# Patient Record
Sex: FEMALE | Race: WHITE | NOT HISPANIC OR LATINO
[De-identification: names, ages, dates, MRNs, and addresses within clinical notes are randomized per-mention and may not be internally consistent; named-entity substitution may affect disease eponyms.]

---

## 2017-06-11 ENCOUNTER — FORM ENCOUNTER (OUTPATIENT)
Age: 62
End: 2017-06-11

## 2017-12-11 ENCOUNTER — FORM ENCOUNTER (OUTPATIENT)
Age: 62
End: 2017-12-11

## 2018-06-18 ENCOUNTER — FORM ENCOUNTER (OUTPATIENT)
Age: 63
End: 2018-06-18

## 2018-11-28 ENCOUNTER — FORM ENCOUNTER (OUTPATIENT)
Age: 63
End: 2018-11-28

## 2018-12-11 ENCOUNTER — FORM ENCOUNTER (OUTPATIENT)
Age: 63
End: 2018-12-11

## 2019-01-31 PROBLEM — Z00.00 ENCOUNTER FOR PREVENTIVE HEALTH EXAMINATION: Status: ACTIVE | Noted: 2019-01-31

## 2019-02-04 ENCOUNTER — APPOINTMENT (OUTPATIENT)
Dept: OPHTHALMOLOGY | Facility: CLINIC | Age: 64
End: 2019-02-04

## 2019-02-28 ENCOUNTER — APPOINTMENT (OUTPATIENT)
Dept: OPHTHALMOLOGY | Facility: CLINIC | Age: 64
End: 2019-02-28

## 2019-05-20 ENCOUNTER — APPOINTMENT (OUTPATIENT)
Dept: OPHTHALMOLOGY | Facility: CLINIC | Age: 64
End: 2019-05-20
Payer: COMMERCIAL

## 2019-05-20 ENCOUNTER — NON-APPOINTMENT (OUTPATIENT)
Age: 64
End: 2019-05-20

## 2019-05-20 PROCEDURE — 92004 COMPRE OPH EXAM NEW PT 1/>: CPT

## 2019-06-12 ENCOUNTER — FORM ENCOUNTER (OUTPATIENT)
Age: 64
End: 2019-06-12

## 2019-08-20 ENCOUNTER — FORM ENCOUNTER (OUTPATIENT)
Age: 64
End: 2019-08-20

## 2019-12-22 ENCOUNTER — FORM ENCOUNTER (OUTPATIENT)
Age: 64
End: 2019-12-22

## 2020-03-15 ENCOUNTER — FORM ENCOUNTER (OUTPATIENT)
Age: 65
End: 2020-03-15

## 2020-04-29 ENCOUNTER — TRANSCRIPTION ENCOUNTER (OUTPATIENT)
Age: 65
End: 2020-04-29

## 2020-05-18 ENCOUNTER — APPOINTMENT (OUTPATIENT)
Dept: OPHTHALMOLOGY | Facility: CLINIC | Age: 65
End: 2020-05-18

## 2020-08-16 ENCOUNTER — FORM ENCOUNTER (OUTPATIENT)
Age: 65
End: 2020-08-16

## 2021-01-22 ENCOUNTER — NON-APPOINTMENT (OUTPATIENT)
Age: 66
End: 2021-01-22

## 2021-03-15 ENCOUNTER — APPOINTMENT (OUTPATIENT)
Dept: ORTHOPEDIC SURGERY | Facility: CLINIC | Age: 66
End: 2021-03-15
Payer: MEDICARE

## 2021-03-15 VITALS
DIASTOLIC BLOOD PRESSURE: 103 MMHG | SYSTOLIC BLOOD PRESSURE: 163 MMHG | HEIGHT: 71 IN | WEIGHT: 170 LBS | OXYGEN SATURATION: 98 % | HEART RATE: 89 BPM | BODY MASS INDEX: 23.8 KG/M2

## 2021-03-15 PROCEDURE — 99203 OFFICE O/P NEW LOW 30 MIN: CPT

## 2021-03-15 PROCEDURE — 99072 ADDL SUPL MATRL&STAF TM PHE: CPT

## 2021-03-15 PROCEDURE — 73030 X-RAY EXAM OF SHOULDER: CPT | Mod: RT

## 2021-03-15 RX ORDER — ESTRADIOL 10 UG/1
10 TABLET VAGINAL
Refills: 0 | Status: ACTIVE | COMMUNITY

## 2021-03-15 NOTE — PHYSICAL EXAM
[UE] : Sensory: Intact in bilateral upper extremities [Rad] : radial 2+ and symmetric bilaterally [Normal RUE] : Right Upper Extremity: No scars, rashes, lesions, ulcers, skin intact [Normal LUE] : Left Upper Extremity: No scars, rashes, lesions, ulcers, skin intact [Normal Touch] : sensation intact for touch [Normal] : Oriented to person, place, and time, insight and judgement were intact and the affect was normal [de-identified] :  shoulder:\par Cervical spine is without tenderness and ROM is within normal limits and without pain.\par Normal appearance. \par AROM: 180 FE, IR to T8 vs T7, 180 abd.\par PROM: 180 FE, 70 ER at the side, 90 ER and 65 IR in the 90 degree abducted position.\par Motor:  5/5  supraspinatus,  5/5 ER, 5/5 IR, 5/5 biceps, 5/5 deltoid.  Normal lift off test\par + Neer, + Wheeler. -  X-arm.   - Richmond's, - Speeds.\par Tender over the biceps tendon and the anterior shoulder.  \par No scapular winging.\par Sensation is intact distally in the UE.\par Skin is intact in the UE. \par Intact Motor distally.\par  [de-identified] : No respiratory distress or cough [de-identified] : \par X-rays of the right shoulder AP, Y. lateral and axillary views today are unremarkable. No bone lesions, calcifications or osteoarthritis.There may be some osteopenia which she was diagnosed with.

## 2021-03-15 NOTE — CONSULT LETTER
[Dear  ___] : Dear  [unfilled], [Consult Letter:] : I had the pleasure of evaluating your patient, [unfilled]. [Please see my note below.] : Please see my note below. [Consult Closing:] : Thank you very much for allowing me to participate in the care of this patient.  If you have any questions, please do not hesitate to contact me. [Sincerely,] : Sincerely, [FreeTextEntry2] : Alan Dechiario, MD [FreeTextEntry3] : Nazia Mckenzie MD\par Orthopedic Surgery\par Sports Medicine\par

## 2021-03-15 NOTE — HISTORY OF PRESENT ILLNESS
[de-identified] : Ms. Nazario is a 64 yo woman Right-hand dominant who comes in with RIGHT shoulder pain over the past year. In 2014 she had a RIGHT mastectomy. Then in August 2019 she had removal of a melanoma from her RIGHT lateral shoulder. There was a very large incision but the melanoma did not go deep. It took a while for it to heal and she did not move her arm a lot after that. She started to move it more but had some stiffness and pain and has had ongoing symptoms in the shoulder over the past year. She plays golf which can be aggravating. Moving her arm excessively causes pain wears it's better with rest. Pain can be a 4/10  intermittently

## 2021-03-15 NOTE — ASSESSMENT
[FreeTextEntry1] : 65-year-old right-hand-dominant woman with RIGHT shoulder pain intermittently over the past year. She has some mild loss of internal rotation in her RIGHT shoulder compared to the LEFT and pain consistent with impingement syndrome or some rotator cuff tendinopathy and bursitis.Overall she has good strength however and good motion. I think she would benefit from physical therapy. Perhaps her shoulder got stiff from not using it for about 6 months following the melanoma excision on the RIGHT arm.\par she was given home exercises to do for the shoulder as well. Heat and ice and ibuprofen as needed. If it still isn't better after doing a couple months of physical therapy then she should come in and we will work it up further likely starting with an MRI

## 2021-04-26 ENCOUNTER — APPOINTMENT (OUTPATIENT)
Dept: ORTHOPEDIC SURGERY | Facility: CLINIC | Age: 66
End: 2021-04-26
Payer: MEDICARE

## 2021-04-26 PROCEDURE — 99213 OFFICE O/P EST LOW 20 MIN: CPT

## 2021-04-26 PROCEDURE — 99072 ADDL SUPL MATRL&STAF TM PHE: CPT

## 2021-04-26 NOTE — ASSESSMENT
[FreeTextEntry1] : 65-year-old right-hand-dominant woman with RIGHT shoulder pain intermittently over the past year.  She had been diagnosed with shoulder impingement/rotator cuff tendinopathy.  Shoulder has improved with 4 weeks of therapy and modified activities and low-dose ibuprofen.\par She had done therapy in [place].  She is back in [place] now and will do a little more physical therapy here and do exercises on her own.  Since she is doing so much better she should be careful not to push too hard and aggravate her shoulder.  Ibuprofen as needed and heat and ice as needed.\par Follow-up  after doing another month of therapy if she is not fully better.  She has ongoing pain we may consider an MRI or getting an MRI.

## 2021-04-26 NOTE — PHYSICAL EXAM
[UE] : Sensory: Intact in bilateral upper extremities [Rad] : radial 2+ and symmetric bilaterally [Normal RUE] : Right Upper Extremity: No scars, rashes, lesions, ulcers, skin intact [Normal LUE] : Left Upper Extremity: No scars, rashes, lesions, ulcers, skin intact [Normal Touch] : sensation intact for touch [Normal] : Oriented to person, place, and time, insight and judgement were intact and the affect was normal [de-identified] : Right  shoulder:\par Cervical spine is without tenderness and ROM is within normal limits and without pain.\par Normal appearance shoulders\par AROM: 180 FE a few degrees less right than left, IR to T8 vs T7, 180 abd.\par PROM: 180 FE, 70 ER at the side, 90 ER and 65 IR in the 90 degree abducted position.\par Motor:  5/5  supraspinatus,  5/5 ER, 5/5 IR, 5/5 biceps, 5/5 deltoid.  Normal lift off test\par - Neer, + Wheeler. -  X-arm.   - Dearborn's, - Speeds.\par nontender over the biceps tendon and the anterior shoulder.  \par No scapular winging.\par Sensation is intact distally in the UE.\par Skin is intact in the UE. \par Intact Motor distally.\par  [de-identified] : No respiratory distress or cough [de-identified] : \par X-rays of the right shoulder AP, Y. lateral and axillary views 3/15/21 were unremarkable. No bone lesions, calcifications or osteoarthritis.

## 2021-04-26 NOTE — HISTORY OF PRESENT ILLNESS
[de-identified] : Ms. Nazario is a 64 yo woman Right-hand dominant who comes in for f/u for RIGHT shoulder pain over the past year.  Her shoulder has improved significantly. She started PT and did 4 wks\par  Pain can be a 1-2/10  intermittently. It feels a bit stiff in the morning\par  She is doing home exercises. She played golf in [place].\par She takes Advil 1 BID.

## 2021-06-08 PROBLEM — N64.9 DISORDER OF BREAST, UNSPECIFIED: Status: ACTIVE | Noted: 2021-06-08

## 2021-06-10 ENCOUNTER — APPOINTMENT (OUTPATIENT)
Dept: BREAST CENTER | Facility: CLINIC | Age: 66
End: 2021-06-10
Payer: MEDICARE

## 2021-06-10 VITALS
HEART RATE: 80 BPM | BODY MASS INDEX: 23.8 KG/M2 | DIASTOLIC BLOOD PRESSURE: 98 MMHG | SYSTOLIC BLOOD PRESSURE: 142 MMHG | HEIGHT: 71 IN | WEIGHT: 170 LBS

## 2021-06-10 DIAGNOSIS — N64.9 DISORDER OF BREAST, UNSPECIFIED: ICD-10-CM

## 2021-06-10 PROCEDURE — 99214 OFFICE O/P EST MOD 30 MIN: CPT

## 2021-06-10 PROCEDURE — 99072 ADDL SUPL MATRL&STAF TM PHE: CPT

## 2021-06-23 ENCOUNTER — NON-APPOINTMENT (OUTPATIENT)
Age: 66
End: 2021-06-23

## 2021-06-23 ENCOUNTER — APPOINTMENT (OUTPATIENT)
Dept: OPHTHALMOLOGY | Facility: CLINIC | Age: 66
End: 2021-06-23
Payer: MEDICARE

## 2021-06-23 PROCEDURE — 99072 ADDL SUPL MATRL&STAF TM PHE: CPT

## 2021-06-23 PROCEDURE — 92014 COMPRE OPH EXAM EST PT 1/>: CPT

## 2021-06-26 ENCOUNTER — TRANSCRIPTION ENCOUNTER (OUTPATIENT)
Age: 66
End: 2021-06-26

## 2021-10-13 ENCOUNTER — APPOINTMENT (OUTPATIENT)
Dept: ORTHOPEDIC SURGERY | Facility: CLINIC | Age: 66
End: 2021-10-13
Payer: MEDICARE

## 2021-10-13 PROCEDURE — 99214 OFFICE O/P EST MOD 30 MIN: CPT

## 2021-10-13 NOTE — ASSESSMENT
[FreeTextEntry1] : 65-year-old woman whose right shoulder pain/impingement has resolved with the therapy and is doing well.  She comes in now for her right knee pain.  I suspect that she has some chondromalacia or mild arthritis causing the pain.\par I recommended trying physical therapy and strengthening.  She was given home exercises and referred to therapy.  Heat and ice as needed.  She can take some ibuprofen or Aleve as needed as well.\par Her knee should get progressively better.\par If not she should come in and we will get x-rays next visit and work-up further.
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
## 2021-10-13 NOTE — PHYSICAL EXAM
[UE/LE] : Sensory: Intact in bilateral upper & lower extremities [Rad] : radial 2+ and symmetric bilaterally [Normal RUE] : Right Upper Extremity: No scars, rashes, lesions, ulcers, skin intact [Normal LUE] : Left Upper Extremity: No scars, rashes, lesions, ulcers, skin intact [Normal Touch] : sensation intact for touch [Normal] : Oriented to person, place, and time, insight and judgement were intact and the affect was normal [de-identified] : Right  shoulder:\par Normal appearance shoulders\par AROM: 180 FE a few degrees less right than left, IR to T8 vs T7, 180 abd.\par PROM: 180 FE, 70 ER at the side, 90 ER and 65 IR in the 90 degree abducted position.\par Motor:  5/5  supraspinatus,  5/5 ER, 5/5 IR, 5/5 biceps, 5/5 deltoid.  Normal lift off test\par - Elliot, - Wheeler. -  X-arm.   - Clifford's, - Speeds.\par nontender over the biceps tendon and the anterior shoulder.  \par No scapular winging.\par Sensation is intact distally in the UE.\par Skin is intact in the UE. \par Intact Motor distally.\par \par Knees\par No edema, ecchymoses, erythema.\par Skin is intact.\par Knee range of motion 0 to 135 degrees.  There is mild patellofemoral crepitus.\par Nontender patella facets, patella tendon or medial or lateral joint lines of either knee of any significance.\par Negative Deepak.\par Ia Lachman.  Negative anterior and posterior drawer and varus and valgus laxity.\par Normal neurovascular exam distally.\par She can squat fully without pain or difficulty [de-identified] : No respiratory distress or cough

## 2021-10-25 ENCOUNTER — TRANSCRIPTION ENCOUNTER (OUTPATIENT)
Age: 66
End: 2021-10-25

## 2022-06-22 ENCOUNTER — NON-APPOINTMENT (OUTPATIENT)
Age: 67
End: 2022-06-22

## 2022-06-22 ENCOUNTER — APPOINTMENT (OUTPATIENT)
Dept: OPHTHALMOLOGY | Facility: CLINIC | Age: 67
End: 2022-06-22
Payer: MEDICARE

## 2022-06-22 PROCEDURE — 92014 COMPRE OPH EXAM EST PT 1/>: CPT

## 2022-07-20 ENCOUNTER — NON-APPOINTMENT (OUTPATIENT)
Age: 67
End: 2022-07-20

## 2022-07-20 DIAGNOSIS — R92.8 OTHER ABNORMAL AND INCONCLUSIVE FINDINGS ON DIAGNOSTIC IMAGING OF BREAST: ICD-10-CM

## 2022-08-04 ENCOUNTER — APPOINTMENT (OUTPATIENT)
Dept: BREAST CENTER | Facility: CLINIC | Age: 67
End: 2022-08-04

## 2022-08-04 ENCOUNTER — NON-APPOINTMENT (OUTPATIENT)
Age: 67
End: 2022-08-04

## 2022-08-04 VITALS
SYSTOLIC BLOOD PRESSURE: 137 MMHG | BODY MASS INDEX: 22.96 KG/M2 | HEART RATE: 80 BPM | WEIGHT: 164 LBS | HEIGHT: 71 IN | DIASTOLIC BLOOD PRESSURE: 82 MMHG

## 2022-08-04 DIAGNOSIS — Z85.3 PERSONAL HISTORY OF MALIGNANT NEOPLASM OF BREAST: ICD-10-CM

## 2022-08-04 DIAGNOSIS — Z90.11 ACQUIRED ABSENCE OF RIGHT BREAST AND NIPPLE: ICD-10-CM

## 2022-08-04 DIAGNOSIS — Z80.0 FAMILY HISTORY OF MALIGNANT NEOPLASM OF DIGESTIVE ORGANS: ICD-10-CM

## 2022-08-04 DIAGNOSIS — I10 ESSENTIAL (PRIMARY) HYPERTENSION: ICD-10-CM

## 2022-08-04 DIAGNOSIS — M75.41 IMPINGEMENT SYNDROME OF RIGHT SHOULDER: ICD-10-CM

## 2022-08-04 DIAGNOSIS — Z60.2 PROBLEMS RELATED TO LIVING ALONE: ICD-10-CM

## 2022-08-04 DIAGNOSIS — Z85.820 PERSONAL HISTORY OF MALIGNANT MELANOMA OF SKIN: ICD-10-CM

## 2022-08-04 DIAGNOSIS — Z72.89 OTHER PROBLEMS RELATED TO LIFESTYLE: ICD-10-CM

## 2022-08-04 DIAGNOSIS — Z78.9 OTHER SPECIFIED HEALTH STATUS: ICD-10-CM

## 2022-08-04 DIAGNOSIS — Z80.3 FAMILY HISTORY OF MALIGNANT NEOPLASM OF BREAST: ICD-10-CM

## 2022-08-04 PROCEDURE — 99215 OFFICE O/P EST HI 40 MIN: CPT

## 2022-08-04 RX ORDER — HYDROCHLOROTHIAZIDE 12.5 MG/1
12.5 TABLET ORAL
Qty: 90 | Refills: 0 | Status: ACTIVE | COMMUNITY
Start: 2022-03-03

## 2022-08-04 RX ORDER — HYDROCHLOROTHIAZIDE 12.5 MG/1
TABLET ORAL
Refills: 0 | Status: ACTIVE | COMMUNITY

## 2022-08-04 SDOH — SOCIAL STABILITY - SOCIAL INSECURITY: PROBLEMS RELATED TO LIVING ALONE: Z60.2

## 2022-08-08 ENCOUNTER — NON-APPOINTMENT (OUTPATIENT)
Age: 67
End: 2022-08-08

## 2022-08-09 ENCOUNTER — NON-APPOINTMENT (OUTPATIENT)
Age: 67
End: 2022-08-09

## 2022-08-11 ENCOUNTER — APPOINTMENT (OUTPATIENT)
Dept: ORTHOPEDIC SURGERY | Facility: CLINIC | Age: 67
End: 2022-08-11

## 2022-08-16 ENCOUNTER — OUTPATIENT (OUTPATIENT)
Dept: OUTPATIENT SERVICES | Facility: HOSPITAL | Age: 67
LOS: 1 days | End: 2022-08-16
Payer: MEDICARE

## 2022-08-16 ENCOUNTER — RESULT REVIEW (OUTPATIENT)
Age: 67
End: 2022-08-16

## 2022-08-16 DIAGNOSIS — C50.911 MALIGNANT NEOPLASM OF UNSPECIFIED SITE OF RIGHT FEMALE BREAST: ICD-10-CM

## 2022-08-16 LAB — SURGICAL PATHOLOGY STUDY: SIGNIFICANT CHANGE UP

## 2022-08-16 PROCEDURE — 88321 CONSLTJ&REPRT SLD PREP ELSWR: CPT

## 2022-08-23 ENCOUNTER — TRANSCRIPTION ENCOUNTER (OUTPATIENT)
Age: 67
End: 2022-08-23

## 2022-08-23 ENCOUNTER — APPOINTMENT (OUTPATIENT)
Dept: HEMATOLOGY ONCOLOGY | Facility: CLINIC | Age: 67
End: 2022-08-23

## 2022-08-23 ENCOUNTER — APPOINTMENT (OUTPATIENT)
Dept: ORTHOPEDIC SURGERY | Facility: AMBULATORY SURGERY CENTER | Age: 67
End: 2022-08-23

## 2022-08-23 DIAGNOSIS — M25.561 PAIN IN RIGHT KNEE: ICD-10-CM

## 2022-08-23 DIAGNOSIS — S80.02XA CONTUSION OF LEFT KNEE, INITIAL ENCOUNTER: ICD-10-CM

## 2022-08-23 DIAGNOSIS — S80.01XA CONTUSION OF RIGHT KNEE, INITIAL ENCOUNTER: ICD-10-CM

## 2022-08-23 PROCEDURE — 99205 OFFICE O/P NEW HI 60 MIN: CPT

## 2022-08-23 PROCEDURE — 73564 X-RAY EXAM KNEE 4 OR MORE: CPT | Mod: 50

## 2022-08-23 PROCEDURE — 99214 OFFICE O/P EST MOD 30 MIN: CPT

## 2022-08-23 NOTE — PHYSICAL EXAM
[Normal Touch] : sensation intact for touch [Normal] : Oriented to person, place, and time, insight and judgement were intact and the affect was normal [UE/LE] : Sensory: Intact in bilateral upper & lower extremities [Rad] : radial 2+ and symmetric bilaterally [Normal RUE] : Right Upper Extremity: No scars, rashes, lesions, ulcers, skin intact [Normal LUE] : Left Upper Extremity: No scars, rashes, lesions, ulcers, skin intact [de-identified] : Right shoulder:\par Normal appearance shoulders\par AROM: 180 FE a few degrees less right than left, IR to T8 vs T7, 180 abd.\par PROM: 180 FE, 70 ER at the side, 90 ER and 35 IR in the 90 degree abducted position.\par Motor:  5/5  supraspinatus,  5/5 ER, 5/5 IR, 5/5 biceps, 5/5 deltoid.  Normal lift off test\par Mild pain with Neer and Wheeler. -  X-arm.   - Norman's, - Speeds.\par nontender over the biceps tendon and the anterior shoulder.  \par No scapular winging.\par Sensation is intact distally in the UE.\par Skin is intact in the UE. \par Intact Motor distally.\par \par Bilateral knees\par Normal gait.\par Pain and cannot squat fully.\par No edema, erythema.  Resolving ecchymoses anteromedial knees\par Skin is intact\par ROM is 0-130 degrees on the right and 135 degrees left knee. There is mild patellofemoral crepitius.\par Tender anterior lateral right knee.\par Intact extensor mechanism.\par Negative Deepak\par 1A Lachman, negative anterior/posterior drawer and normal varus/valgus laxity\par Normal neurovascular exam distally [de-identified] : No respiratory distress or cough [de-identified] : \par X-rays of bilateral knees weightbearing 4 views today showed  Minimal joint space narrowing compartment on right greater than left knee.  No fractures or acute changes or any significant arthritis. KL1

## 2022-08-23 NOTE — ASSESSMENT
[FreeTextEntry1] : 65 y/o female with RIGHT knee pain following a fall on both knees.  She had contusions of her knees and fall on her outstretched right arm.  Exam is consistent with some bruising but no fractures are suspected or seen.  She likely has some chondral wear in the knees without significant arthritis.  \par Her shoulder she may have strained her rotator cuff slightly but overall it also seems to be relatively good.\par \par I recommended icing intermittently.  Ibuprofen as needed. \par She was given home exercises with rotator cuff and periscapular strengthening for the shoulder and for her knees she continues to do strengthening with leg lifts but avoid anything aggravating.\par Follow-up if she is not better in the next month or as needed at any time.

## 2022-08-23 NOTE — HISTORY OF PRESENT ILLNESS
[de-identified] : Ms. Nazario is a 67 y/o woman who comes in for injury involving her right knee more than the left from a fall that occurred about 2 weeks ago when she was at her home walking up some wooden steps in flip-flops which got caught and she fell forward on her knees and outstretched right arm.\par She had some right shoulder and knee pain in the past and was seen in October 2021 for the right shoulder and right knee.\par \par From the injury she can move but has pain with deep squatting or bending.  It does seem to be getting better.  There was bruising on both knees.  She can move her shoulder but it is a little more painful now\par She has been taking 1 Advil once or twice a day.\par

## 2022-08-30 ENCOUNTER — APPOINTMENT (OUTPATIENT)
Dept: HEMATOLOGY ONCOLOGY | Facility: CLINIC | Age: 67
End: 2022-08-30

## 2022-08-30 PROCEDURE — 99215 OFFICE O/P EST HI 40 MIN: CPT

## 2022-08-30 RX ORDER — LORAZEPAM 0.5 MG/1
0.5 TABLET ORAL TWICE DAILY
Qty: 30 | Refills: 0 | Status: ACTIVE | COMMUNITY
Start: 2022-08-30 | End: 1900-01-01

## 2022-08-31 ENCOUNTER — APPOINTMENT (OUTPATIENT)
Dept: BREAST CENTER | Facility: CLINIC | Age: 67
End: 2022-08-31

## 2022-09-02 ENCOUNTER — NON-APPOINTMENT (OUTPATIENT)
Age: 67
End: 2022-09-02

## 2022-09-19 ENCOUNTER — APPOINTMENT (OUTPATIENT)
Dept: HEMATOLOGY ONCOLOGY | Facility: CLINIC | Age: 67
End: 2022-09-19

## 2022-09-19 PROCEDURE — 99215 OFFICE O/P EST HI 40 MIN: CPT

## 2022-09-23 ENCOUNTER — NON-APPOINTMENT (OUTPATIENT)
Age: 67
End: 2022-09-23

## 2022-10-25 ENCOUNTER — APPOINTMENT (OUTPATIENT)
Dept: PSYCHIATRY | Facility: CLINIC | Age: 67
End: 2022-10-25

## 2022-10-25 PROCEDURE — 90792 PSYCH DIAG EVAL W/MED SRVCS: CPT | Mod: 95

## 2022-10-27 ENCOUNTER — NON-APPOINTMENT (OUTPATIENT)
Age: 67
End: 2022-10-27

## 2022-11-04 ENCOUNTER — APPOINTMENT (OUTPATIENT)
Dept: PSYCHIATRY | Facility: CLINIC | Age: 67
End: 2022-11-04

## 2022-11-04 ENCOUNTER — NON-APPOINTMENT (OUTPATIENT)
Age: 67
End: 2022-11-04

## 2022-11-04 NOTE — PHYSICAL EXAM
[Cooperative] : cooperative [Depressed] : depressed [Constricted] : constricted [Clear] : clear [Linear/Goal Directed] : linear/goal directed [None] : none [None Reported] : none reported [Average] : average [WNL] : within normal limits

## 2022-11-09 NOTE — PLAN
[Supportive Therapy] : Supportive Therapy [Recommended Frequency of Visits: ____] : Recommended frequency of visits: [unfilled] [Return in ____ week(s)] : Return in [unfilled] week(s) [FreeTextEntry2] : Problem/Need I: \par Domain for Problem/Need I: Mental Health. \par Problem: Experiences challenging emotions in response to daily life and her medical illness and treatment. \par Goal (In patient's words): Express challenging emotions. \par Status of Goal: Initial \par Objective A: Process challenging emotions in a supportive environment \par Status of Objective: Initial \par Individual Therapy: every 2 weeks. \par \par Problem/Need II: \par Domain for Problem/Need II: Substance Abuse. \par Problem: Alcohol Use. \par Goal (In patient's words): Manage alcohol use. \par Status of Goal: Initial\par Objective A: Learn and utilize effective coping skills so as to prevent increases in alcohol consumption.\par Status of Objective: Initial \par Individual Therapy: every 2 weeks.  [de-identified] : The writer met with the patient for individual psychotherapy via video. The writer and patient completed intake screeners, including the SOS-10 and completing the HAM-D. The writer and patient also completed the patient's treatment plan and discussed treatment goals. The writer and patient also discussed the patient's relationships and recent activity. Supportive therapy was provided throughout. No S/H/I/I/P/A/V/H reported. The patient was meaningfully engaged and receptive to these interventions. The patient ended the session in good behavioral and emotional control.  [FreeTextEntry1] : 1. The patient will engage in individual psychotherapy every other week. \par 2. The patient will continue to be evaluated by psychiatry as appropriate.

## 2022-11-09 NOTE — ADDENDUM
[FreeTextEntry1] : Case discussed in clinical supervision [[X]] individual [[X]] group (Check one) ASSESSMENT METHOD (Check all that apply):  [[X]] Case presentation  [[X]] Review of Record/Data  [[ ]] Direct Observation  [[ ]] Audio Recording  [[ ]] Video Recording  FOCUS OF SUPERVISION (Check all that apply):  [[X]] Diagnosis & Assessment  [[X]] Intervention  [[X]] Professional Conduct  [[ ]] Culture and Diversity  [[ ]] Scholarly Inquiry    [[ ]] Consultation  [[ ]] Supervision  [[ ]] Administration/Documentation

## 2022-11-09 NOTE — DISCUSSION/SUMMARY
[Initial Plan] : Initial Plan [Able to manage surrounding demands and opportunities] : able to manage surrounding demands and opportunities [Able to exercise self-direction] : able to exercise self-direction [Able to set and pursue goals] : able to set and pursue goals [Articulate] : articulate [Cognitively intact] : cognitively intact [Intelligent] : intelligent [Motivated to participate in treatment] : motivated to participate in treatment [Health literacy] : health literacy [Motivated to maintain or improve physical health] : motivated to maintain or improve physical health [Financially stable] : financially stable [Has vocational interests or hobbies] : has vocational interests or hobbies [Part of a supportive family] : part of a supportive family [Has a supportive network] : has a supportive network [Housing stability] : housing stability [High level of education] : high level of education [English fluency] : English fluency [Connected to healthcare] : connected to healthcare [Good health literacy] : good health literacy [Access to safe outdoor spaces] : access to safe outdoor spaces [Social supports] : social supports [Mental Health] : Mental Health [Substance Abuse] : Substance Abuse [Initial] : Initial [every ___ weeks] : every [unfilled] weeks [Improvement in symptoms as evidenced by:] : Improvement in symptoms as evidenced by: [Yes] : Yes [Therapist] : Therapist [Supervisor (if needed)] : Supervisor [FreeTextEntry1] : Alcohol Use [FreeTextEntry4] : Manage alcohol use [de-identified] : Learn and utilize effective coping skills so as to prevent increases in alcohol consumption.  [de-identified] : improved use of effective coping skills; management of alcohol use; reduction of anxiety and depression symptoms.

## 2022-11-09 NOTE — RISK ASSESSMENT
[FreeTextEntry8] : The patient did not report any suicidal ideation, plan, intent, or NSSIB.  [FreeTextEntry7] : The patient did not report any homicidal or aggressive ideation, plan, or intent.  [FreeTextEntry9] : At the present time the patient does not appear to be at imminent risk of harm to self or others.

## 2022-11-09 NOTE — ADDENDUM
[FreeTextEntry1] : Case discussed in clinical supervision [[X]] individual [[X]] group (Check one) ASSESSMENT METHOD (Check all that apply):  [[X]] Case presentation  [[X]] Review of Record/Data  [[ ]] Direct Observation  [[ ]] Audio Recording  [[ ]] Video Recording  FOCUS OF SUPERVISION (Check all that apply):  [[X]] Diagnosis & Assessment  [[X]] Intervention  [[ ]] Professional Conduct  [[ ]] Culture and Diversity  [[ ]] Scholarly Inquiry    [[ ]] Consultation  [[ ]] Supervision  [[ ]] Administration/Documentation\par \par Supervisor: Narinder Josue, Ph.D.

## 2022-11-09 NOTE — REASON FOR VISIT
[Home] : at home, [unfilled] , at the time of the visit. [Medical Office: ([facility] [place])___] : at the medical office located in  [Self] : self [Patient] : Patient [FreeTextEntry1] : Experiencing some symptoms of depressed mood and anxiety; alcohol use

## 2022-11-09 NOTE — END OF VISIT
[Duration of Psychotherapy Visit (minutes spent in synchronous communication): ____] : Duration of Psychotherapy Visit (minutes spent in synchronous communication): [unfilled] [Individual Psychotherapy for 38-52 minutes] : Individual Psychotherapy for 38 - 52 minutes [Teletherapy Service Provided] : The services provided in this session were delivered via tele-therapy [FreeTextEntry3] : Home; [place] [FreeTextEntry5] : Home

## 2022-11-10 NOTE — PHYSICAL EXAM
[Cooperative] : cooperative [Depressed] : depressed [Irritable] : irritable [Constricted] : constricted [Clear] : clear [Linear/Goal Directed] : linear/goal directed [None] : none [None Reported] : none reported [Average] : average [WNL] : within normal limits [2] : 2 - Moderately concerned with physical health [6] : 6 [1] : 1 [3 - Mildly ill] : 3 - Mildly ill  (clearly established symptoms with minimal, if any, distress or difficulty in social and occupational function) [1 - Not observed (Not Present)] : 1 - Not observed (Not Present) [1 - Not Observed (Not Present)] : 1 - Not Observed (Not Present) [1 - Not reported (Not Present)] : 1 - Not reported (Not Present) [1 - Not reported (Not present)] : 1 - Not reported (Not present) [1 - Not observed (Not present)] : 1 - Not observed (Not present) [0] : 0 [0 - None] : 0 - None [0 - Always resists] : 0 - Always resists [0 - Complete control] :  0 - Complete control [] : No [FreeTextEntry1] : 49 [FreeTextEntry4] : 0

## 2022-11-10 NOTE — SOCIAL HISTORY
[FreeTextEntry1] : The patient received a Bachelors from the [employer] and an BRENDA from [employer]. 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 She is currently retired. Previously in a ~20 year relationship, though is currently single living on her own in [place]. The patient identified her aunt as being supportive during this time. In general, she described herself as independent and "type A". \par \par

## 2022-11-10 NOTE — PAST MEDICAL HISTORY
[FreeTextEntry1] : The patient has been diagnosed with breast cancer three times (25 years ago and 8 years ago), though denied any other significant medical history.

## 2022-11-10 NOTE — PSYCHOSOCIAL ASSESSMENT
[No] : Patient attended school, home tutoring, or received education instruction at anytime in the past three months? No [Retired] : retired [Financially stable] : financially stable [Client lives alone] : client lives alone [FreeTextEntry1] : The patient reported drinking 2 to 3 alcoholic drinks per night, though reported previously drinking up to a bottle of wine each night. Denied impacting relationships or causing blackouts (once in college). The patient previously tried to reduce alcohol consumption. Patient report marijuana use in high school though denied any other substance use.

## 2022-11-10 NOTE — PLAN
[Admit to Program     (Add Program Admission information to a new column in the Admit/Discharge Flowsheet)] : Admit to program [Every ___ week(s)] : Psychotherapy: Every [unfilled] week(s) [FreeTextEntry4] : 1. Patient will complete intake screeners with psychology. \par 2. The patient will engage in individual psychotherapy with psychology. Specifics will be discussed, though every other week appears appropriate at this time. \par 3. The patient will continue her evaluation with psychiatry for potential medication management services.

## 2022-11-10 NOTE — REASON FOR VISIT
[Psychiatric Outpatient Program] : Psychiatric Outpatient Program [Patient] : Patient [Prior Medical Records] : Prior Medical Records [FreeTextEntry5] : English [FreeTextEntry2] : Continued treatment following Dr. Ewing's departure from [facility].  [FreeTextEntry1] : Patient reported feelings of depressed mood, anxiety, and frustration in the context of her medical illness and treatment. Patient also reported significant alcohol use.

## 2022-11-10 NOTE — DISCUSSION/SUMMARY
[Low acute suicide risk] : Low acute suicide risk [No] : No [Not clinically indicated] : Safety Plan completed/updated (for individuals at risk): Not clinically indicated [FreeTextEntry1] : The patient denied any history of suicidal or homicidal ideation, plan, intent, behavior or NSSIB. The patient does not appear to be in acute crisis or at imminent risk of harm to self or others. Inpatient hospitalization is not required at this time.

## 2022-11-10 NOTE — FAMILY HISTORY
[FreeTextEntry1] : Patient reported that her parents were social drinkers, though her mother's alcohol intake may have increased later in life. Patient denied any other family history of suicide or mental illness.

## 2022-11-10 NOTE — HISTORY OF PRESENT ILLNESS
[FreeTextEntry1] :  The patient reported feelings of depressed mood, including some sadness, loneliness, and recent weight loss (8 lbs) which she attributes to feelings of anxiety. In general, the patient reported mood fluctuations between days, though denied any symptoms of mal. Patient reported adequate sleep and energy. In general, the patient expressed frustration with medicine as well as within her interpersonal relationships. Patient reported feelings of depressed mood since early August in the context of her medical illness and treatment. Patient denied AH/VH or paranoia. Patient also reported significant alcohol use (see below). \par  [FreeTextEntry2] : The patient reported being in therapy with a psychologist for 6 years in the early 1990s in which she reportedly worked on her relationships with men, which she described as at least partially challenging in response to her brother's suicide during her adolescence. Patient reported engaging in EMDR in response to intrusive images related to his passing. Patient denied any nightmares or significant anxiety related to his passing. No history of psychiatric medications, hospitalizations, psych ER visits.  No psychiatric medications. Issues with men. Older brother committed suicide. \par  [FreeTextEntry3] : No pharmacological tx prior to working with Dr. Ewing.

## 2022-11-10 NOTE — RISK ASSESSMENT
[Clinical Records] : Clinical Records [Clinical Interview] : Clinical Interview [Depressed mood/Anhedonia] : depressed mood/anhedonia [Family Hx of suicide/suicidal behavior] : family history of suicide/suicidal behavior [Change in provider or treatment (i.e., medications, psychotherapy, milieu)] : change in provider or treatment (i.e., medications, psychotherapy, milieu) [Triggering events leading to humiliation, shame, and/or despair] : triggering events leading to humiliation, shame, and/or despair (e.g. loss of relationship, financial or health status) (real or anticipated) [Recent onset of psychiatric illness] : recent onset of psychiatric illness [Supportive social network of family or friends] : supportive social network of family or friends [Positive therapeutic relationships] : positive therapeutic relationships [None in the patient's lifetime] : None in the patient's lifetime [None Known] : none known [Substance abuse] : substance abuse [Affective dysregulation] : affective dysregulation [Residential stability] : residential stability [Engagement in treatment] : engagement in treatment [No] : no [TextBox_32] : Patient denied any history of NSSIB [FreeTextEntry3] : Alcohol use [de-identified] : Patient denied access to guns or weapons

## 2022-11-18 ENCOUNTER — APPOINTMENT (OUTPATIENT)
Dept: PSYCHIATRY | Facility: CLINIC | Age: 67
End: 2022-11-18

## 2022-11-23 NOTE — END OF VISIT
[Duration of Psychotherapy Visit (minutes spent in synchronous communication): ____] : Duration of Psychotherapy Visit (minutes spent in synchronous communication): [unfilled] [Individual Psychotherapy for 16-37 minutes] : Individual Psychotherapy for 16-37 minutes [Teletherapy Service Provided] : The services provided in this session were delivered via tele-therapy [FreeTextEntry3] : Home [FreeTextEntry5] : Home

## 2022-11-23 NOTE — PLAN
[Psychodynamic Therapy] : Psychodynamic Therapy  [Recommended Frequency of Visits: ____] : Recommended frequency of visits: [unfilled] [Return in ____ week(s)] : Return in [unfilled] week(s) [FreeTextEntry2] : Problem/Need I: \par Domain for Problem/Need I: Mental Health. \par Problem: Experiences challenging emotions in response to daily life and her medical illness and treatment. \par Goal (In patient's words): Express challenging emotions. \par Status of Goal: Initial \par Objective A: Process challenging emotions in a supportive environment \par Status of Objective: Initial \par Individual Therapy: every 2 weeks. \par \par Problem/Need II: \par Domain for Problem/Need II: Substance Abuse. \par Problem: Alcohol Use. \par Goal (In patient's words): Manage alcohol use. \par Status of Goal: Initial\par Objective A: Learn and utilize effective coping skills so as to prevent increases in alcohol consumption.\par Status of Objective: Initial \par Individual Therapy: every 2 weeks.  [de-identified] : The writer met with the patient for individual psychotherapy via video. The writer and patient explored the patient's interpersonal relationships in the context of her medical illness. The writer and patient also briefly discussed the patient's alcohol use, with the patient reporting that she has not drank recently in response to illness. No S/H/I/I/P/A/V/H reported. The patient was meaningfully engaged and receptive to these interventions. The patient ended the session in good behavioral and emotional control.  [FreeTextEntry1] : 1. The patient will engage in individual psychotherapy every other week. \par 2. The patient will continue to be evaluated by psychiatry as appropriate.

## 2022-11-23 NOTE — REASON FOR VISIT
[Home] : at home, [unfilled] , at the time of the visit. [Medical Office: ([facility])___] : at the medical office located in  [Patient] : the patient [Self] : self [FreeTextEntry1] : Experiencing some symptoms of depressed mood and anxiety; alcohol use

## 2022-12-02 ENCOUNTER — APPOINTMENT (OUTPATIENT)
Dept: PSYCHIATRY | Facility: CLINIC | Age: 67
End: 2022-12-02

## 2022-12-06 ENCOUNTER — FORM ENCOUNTER (OUTPATIENT)
Age: 67
End: 2022-12-06

## 2022-12-06 ENCOUNTER — NON-APPOINTMENT (OUTPATIENT)
Age: 67
End: 2022-12-06

## 2022-12-06 NOTE — PLAN
[Cognitive and/or Behavior Therapy] : Cognitive and/or Behavior Therapy  [Psychodynamic Therapy] : Psychodynamic Therapy  [Recommended Frequency of Visits: ____] : Recommended frequency of visits: [unfilled] [Return in ____ week(s)] : Return in [unfilled] week(s) [FreeTextEntry2] : Problem/Need I: \par Domain for Problem/Need I: Mental Health. \par Problem: Experiences challenging emotions in response to daily life and her medical illness and treatment. \par Goal (In patient's words): Express challenging emotions. \par Status of Goal: Initial \par Objective A: Process challenging emotions in a supportive environment \par Status of Objective: Initial \par Individual Therapy: every 2 weeks. \par \par Problem/Need II: \par Domain for Problem/Need II: Substance Abuse. \par Problem: Alcohol Use. \par Goal (In patient's words): Manage alcohol use. \par Status of Goal: Initial\par Objective A: Learn and utilize effective coping skills so as to prevent increases in alcohol consumption.\par Status of Objective: Initial \par Individual Therapy: every 2 weeks.  [de-identified] : The writer met with the patient for individual psychotherapy via video. The writer and patient discussed the patient's cancer and its impact on her health and emotional experience, with the patient reporting a tendency to compartmentalize. In discussing her health, the patient expressed that she is strongly considering assisted suicide in the future to avoid "rotting"; however, given the patient's current health and functioning (she began stretching class again this week, is engaging in social plans, and is continuing to learn Arabic), the fact she said she may live for another few years, and her future-oriented presentation (she is planning a ~2 month trip to [place] with a break in [place] in the middle during which she scheduled a psychotherapy session today) at the present time the patient does not appear to be at imminent risk of harm to self or others. The patient denied any other suicidal ideation, plan, or intent. The writer discussed these matters with his supervisor. The writer and patient also discussed the patient's alcohol use, with the writer working with the patient to identify potential emotional triggers and encouraging the patient to identify additional activities to engage in at night to help reduce alcohol consumption. Patient denied blackouts or impacts on psychosocial functioning. No H/I/I/P/A/V/H reported. The patient provided verbal consent for the writer to speak with her oncologist to increase understanding of patient's medical health. The patient reported that her oncologist had recommended psychopharmacological medication; the writer and patient explored the patient's views on this. The patient reported that she is going to schedule an appointment with her psychiatrist. The patient was meaningfully engaged and receptive to these interventions. The patient ended the session in good behavioral and emotional control.  [FreeTextEntry1] : 1. The patient will engage in individual psychotherapy every other week. \par 2. The patient will continue to be evaluated by psychiatry as appropriate.

## 2022-12-06 NOTE — ADDENDUM
[FreeTextEntry1] : Case discussed in clinical supervision [[X]] individual [[X]] group (Check one) ASSESSMENT METHOD (Check all that apply):  [[X]] Case presentation  [[X]] Review of Record/Data  [[ ]] Direct Observation  [[ ]] Audio Recording  [[ ]] Video Recording  FOCUS OF SUPERVISION (Check all that apply):  [[X]] Diagnosis & Assessment  [[X]] Intervention  [[ ]] Professional Conduct  [[X]] Culture and Diversity  [[ ]] Scholarly Inquiry    [[ ]] Consultation  [[ ]] Supervision  [[ ]] Administration/Documentation\par \par Supervisor: Narinder Josue, Ph.D.

## 2022-12-06 NOTE — REASON FOR VISIT
[Home] : at home, [unfilled] , at the time of the visit. [Medical Office: ([facility])___] : at the medical office located in  [Patient] : the patient [Self] : self [FreeTextEntry1] : Experiencing some symptoms of depressed mood and anxiety; alcohol use

## 2022-12-06 NOTE — PHYSICAL EXAM
[Cooperative] : cooperative [Constricted] : constricted [Clear] : clear [Linear/Goal Directed] : linear/goal directed [None] : none [None Reported] : none reported [Average] : average [WNL] : within normal limits [FreeTextEntry8] : Neutral

## 2022-12-06 NOTE — END OF VISIT
[Duration of Psychotherapy Visit (minutes spent in synchronous communication): ____] : Duration of Psychotherapy Visit (minutes spent in synchronous communication): [unfilled] [Individual Psychotherapy for 38-52 minutes] : Individual Psychotherapy for 38 - 52 minutes [Teletherapy Service Provided] : The services provided in this session were delivered via tele-therapy [FreeTextEntry3] : Home [FreeTextEntry5] : [facility]

## 2022-12-09 ENCOUNTER — NON-APPOINTMENT (OUTPATIENT)
Age: 67
End: 2022-12-09

## 2022-12-12 ENCOUNTER — NON-APPOINTMENT (OUTPATIENT)
Age: 67
End: 2022-12-12

## 2022-12-16 ENCOUNTER — APPOINTMENT (OUTPATIENT)
Dept: PSYCHIATRY | Facility: CLINIC | Age: 67
End: 2022-12-16

## 2022-12-19 ENCOUNTER — APPOINTMENT (OUTPATIENT)
Dept: PSYCHIATRY | Facility: CLINIC | Age: 67
End: 2022-12-19

## 2022-12-19 PROCEDURE — 99214 OFFICE O/P EST MOD 30 MIN: CPT | Mod: 95

## 2022-12-19 NOTE — PHYSICAL EXAM
[Anxious] : anxious [Full] : full [Clear] : clear [Linear/Goal Directed] : linear/goal directed [Above average] : above average [WNL] : within normal limits

## 2022-12-19 NOTE — HISTORY OF PRESENT ILLNESS
[FreeTextEntry1] : Patient was seen today through telehealth. She was referred to see the writer after her oncologist, dr Andrews suggested starting an anxiolytic. Patient reports that she continues to have high anxiety about her current medical problems, medical appointments and workup and living with the uncertainty of her prognosis. She also reports fluctuations in her mood, especially when she is alone at night. She reports ongoing use of alcohol (approx 1 bottle per night). She denies any complications related to using alcohol. She is in agreement with cutting down on her drinking but she is not interested at this time in stopping. She discusses her scheduled vacation in [place], recent social evens with friends that she was able to enjoy. No SI/HI. No AVH/PI.

## 2022-12-19 NOTE — DISCUSSION/SUMMARY
[FreeTextEntry1] : Patient is a 65 y/o woman with metastatic breast cancer and with pph of anxiety, depression, alcohol dependence, referred to our program after her psychiatrist left [facility]. Patient is currently seeing dr Chavez q2 weeks for individual psychotherapy. She currently endorses anxiety and fluctuations in mood in context of stress related to her cancer dg and living with the uncertainty of her prognosis. She reports daily use of alcohol that likely contributes to her symptoms. She would like to cut down but in not currently interested in stopping. No SI/HI/AVH/Pi. Future oriented thinking.

## 2022-12-22 NOTE — END OF VISIT
[Duration of Psychotherapy Visit (minutes spent in synchronous communication): ____] : Duration of Psychotherapy Visit (minutes spent in synchronous communication): [unfilled] [Individual Psychotherapy for 38-52 minutes] : Individual Psychotherapy for 38 - 52 minutes [Teletherapy Service Provided] : The services provided in this session were delivered via tele-therapy [FreeTextEntry3] : Home [FreeTextEntry5] : [facility]

## 2022-12-22 NOTE — PLAN
[Cognitive and/or Behavior Therapy] : Cognitive and/or Behavior Therapy  [Psychodynamic Therapy] : Psychodynamic Therapy  [Recommended Frequency of Visits: ____] : Recommended frequency of visits: [unfilled] [Return in ____ week(s)] : Return in [unfilled] week(s) [FreeTextEntry2] : Problem/Need I: \par Domain for Problem/Need I: Mental Health. \par Problem: Experiences challenging emotions in response to daily life and her medical illness and treatment. \par Goal (In patient's words): Express challenging emotions. \par Status of Goal: Initial \par Objective A: Process challenging emotions in a supportive environment \par Status of Objective: Initial \par Individual Therapy: every 2 weeks. \par \par Problem/Need II: \par Domain for Problem/Need II: Substance Abuse. \par Problem: Alcohol Use. \par Goal (In patient's words): Manage alcohol use. \par Status of Goal: Initial\par Objective A: Learn and utilize effective coping skills so as to prevent increases in alcohol consumption.\par Status of Objective: Initial \par Individual Therapy: every 2 weeks.  [de-identified] : The writer met with the patient for individual psychotherapy via video. The writer and patient explored the patient's response to the lack of control she has with respect to her medical illness and treatment and its potential impact on her daily functioning. The patient reported increased socialization, with the writer and patient briefly discussing her alcohol use and a potential method to reduce consumption. The patient did not report any SHIIPAVH. The patient was meaningfully engaged and receptive to these interventions. The patient ended the session in good behavioral and emotional control.  [FreeTextEntry1] : 1. The patient will engage in individual psychotherapy every other week. \par 2. The patient will continue to be evaluated by psychiatry as appropriate.

## 2023-01-04 ENCOUNTER — APPOINTMENT (OUTPATIENT)
Dept: PSYCHIATRY | Facility: CLINIC | Age: 68
End: 2023-01-04

## 2023-01-04 ENCOUNTER — NON-APPOINTMENT (OUTPATIENT)
Age: 68
End: 2023-01-04

## 2023-01-05 ENCOUNTER — APPOINTMENT (OUTPATIENT)
Dept: PSYCHIATRY | Facility: CLINIC | Age: 68
End: 2023-01-05

## 2023-01-10 NOTE — END OF VISIT
[Duration of Psychotherapy Visit (minutes spent in synchronous communication): ____] : Duration of Psychotherapy Visit (minutes spent in synchronous communication): [unfilled] [Individual Psychotherapy for 38-52 minutes] : Individual Psychotherapy for 38 - 52 minutes [Teletherapy Service Provided] : The services provided in this session were delivered via tele-therapy [FreeTextEntry3] : Home [FreeTextEntry5] : [facility]

## 2023-01-10 NOTE — PLAN
[Cognitive and/or Behavior Therapy] : Cognitive and/or Behavior Therapy  [Psychodynamic Therapy] : Psychodynamic Therapy  [Recommended Frequency of Visits: ____] : Recommended frequency of visits: [unfilled] [Return in ____ week(s)] : Return in [unfilled] week(s) [FreeTextEntry2] : Problem/Need I: \par Domain for Problem/Need I: Mental Health. \par Problem: Experiences challenging emotions in response to daily life and her medical illness and treatment. \par Goal (In patient's words): Express challenging emotions. \par Status of Goal: Initial \par Objective A: Process challenging emotions in a supportive environment \par Status of Objective: Initial \par Individual Therapy: every 2 weeks. \par \par Problem/Need II: \par Domain for Problem/Need II: Substance Abuse. \par Problem: Alcohol Use. \par Goal (In patient's words): Manage alcohol use. \par Status of Goal: Initial\par Objective A: Learn and utilize effective coping skills so as to prevent increases in alcohol consumption.\par Status of Objective: Initial \par Individual Therapy: every 2 weeks.  [de-identified] : The writer met with the patient for individual psychotherapy via video. The writer and patient processed the patient's emotions and interpersonal relationships in the context of her medical illness and treatment. The patient did not report any SHIIPAVH. The patient was meaningfully engaged and receptive to these interventions. The patient ended the session in good behavioral and emotional control.  [FreeTextEntry1] : 1. The patient will engage in individual psychotherapy every other week. \par 2. The patient will continue to be evaluated by psychiatry as appropriate.

## 2023-01-10 NOTE — REASON FOR VISIT
[Home] : at home, [unfilled] , at the time of the visit. [Medical Office: ([facility])___] : at the medical office located in  [Patient] : the patient [Self] : self [FreeTextEntry1] : Experiencing some symptoms of depressed mood and anxiety; alcohol use

## 2023-01-10 NOTE — PHYSICAL EXAM
[Cooperative] : cooperative [Full] : full [Clear] : clear [Linear/Goal Directed] : linear/goal directed [None] : none [None Reported] : none reported [Average] : average [WNL] : within normal limits [FreeTextEntry8] : Neutral

## 2023-02-02 ENCOUNTER — APPOINTMENT (OUTPATIENT)
Dept: PSYCHIATRY | Facility: CLINIC | Age: 68
End: 2023-02-02
Payer: MEDICARE

## 2023-02-02 DIAGNOSIS — F41.9 ANXIETY DISORDER, UNSPECIFIED: ICD-10-CM

## 2023-02-02 PROCEDURE — 99213 OFFICE O/P EST LOW 20 MIN: CPT | Mod: 95

## 2023-02-03 ENCOUNTER — APPOINTMENT (OUTPATIENT)
Dept: PSYCHIATRY | Facility: CLINIC | Age: 68
End: 2023-02-03

## 2023-02-08 PROBLEM — F41.9 ANXIETY: Status: ACTIVE | Noted: 2022-08-04

## 2023-02-09 NOTE — RISK ASSESSMENT
[Low acute suicide risk] : Low acute suicide risk [No] : No [Not clinically indicated] : Safety Plan completed/updated (for individuals at risk): Not clinically indicated [FreeTextEntry8] : The patient did not report any suicidal ideation, plan, intent, or NSSIB.  [FreeTextEntry7] : The patient did not report any homicidal or aggressive ideation, plan, or intent.  [FreeTextEntry9] : At the present time the patient does not appear to be at imminent risk of harm to self or others.

## 2023-02-09 NOTE — END OF VISIT
[Duration of Psychotherapy Visit (minutes spent in synchronous communication): ____] : Duration of Psychotherapy Visit (minutes spent in synchronous communication): [unfilled] [Individual Psychotherapy for 38-52 minutes] : Individual Psychotherapy for 38 - 52 minutes [Teletherapy Service Provided] : The services provided in this session were delivered via tele-therapy [FreeTextEntry3] : Home [FreeTextEntry5] : [facility]

## 2023-02-09 NOTE — PLAN
[Cognitive and/or Behavior Therapy] : Cognitive and/or Behavior Therapy  [Psychodynamic Therapy] : Psychodynamic Therapy  [Recommended Frequency of Visits: ____] : Recommended frequency of visits: [unfilled] [Return in ____ week(s)] : Return in [unfilled] week(s) [FreeTextEntry2] : Problem/Need I: \par Domain for Problem/Need I: Mental Health. \par Problem: Experiences challenging emotions in response to daily life and her medical illness and treatment. \par Goal (In patient's words): Express challenging emotions. \par Status of Goal: Initial \par Objective A: Process challenging emotions in a supportive environment \par Status of Objective: Initial \par Individual Therapy: every 2 weeks. \par \par Problem/Need II: \par Domain for Problem/Need II: Substance Abuse. \par Problem: Alcohol Use. \par Goal (In patient's words): Manage alcohol use. \par Status of Goal: Initial\par Objective A: Learn and utilize effective coping skills so as to prevent increases in alcohol consumption.\par Status of Objective: Initial \par Individual Therapy: every 2 weeks.  [de-identified] : The writer met with the patient for individual psychotherapy via video. The writer and patient processed the patient's emotions and goals as they pertain to her interpersonal relationships. Identity within the context of her medical health was explored. The patient did not report any SHIIPAVH. The patient was meaningfully engaged and receptive to these interventions. The patient ended the session in good behavioral and emotional control.  [FreeTextEntry1] : 1. The patient will engage in individual psychotherapy every other week. \par 2. The patient will continue to be evaluated by psychiatry as appropriate.

## 2023-02-09 NOTE — REASON FOR VISIT
[Telehealth (audio & video) - Individual/Group] : This visit was provided via telehealth using real-time 2-way audio visual technology. [Verbal consent obtained from patient/other participant(s)] : Verbal consent for telehealth/telephonic services obtained from patient/other participant(s) [Home] : at home, [unfilled] , at the time of the visit. [Medical Office: ([facility])___] : at the medical office located in  [Patient] : the patient [Self] : self [FreeTextEntry4] : 1 [FreeTextEntry5] : 145 [FreeTextEntry1] : Experiencing some symptoms of depressed mood and anxiety; alcohol use

## 2023-02-16 ENCOUNTER — NON-APPOINTMENT (OUTPATIENT)
Age: 68
End: 2023-02-16

## 2023-03-03 ENCOUNTER — APPOINTMENT (OUTPATIENT)
Dept: PSYCHIATRY | Facility: CLINIC | Age: 68
End: 2023-03-03

## 2023-03-03 ENCOUNTER — NON-APPOINTMENT (OUTPATIENT)
Age: 68
End: 2023-03-03

## 2023-03-07 NOTE — PLAN
[Cognitive and/or Behavior Therapy] : Cognitive and/or Behavior Therapy  [Psychodynamic Therapy] : Psychodynamic Therapy  [Recommended Frequency of Visits: ____] : Recommended frequency of visits: [unfilled] [Return in ____ week(s)] : Return in [unfilled] week(s) [FreeTextEntry2] : Problem/Need I: \par Domain for Problem/Need I: Mental Health. \par Problem: Experiences challenging emotions in response to daily life and her medical illness and treatment. \par Goal (In patient's words): Express challenging emotions. \par Status of Goal: Initial \par Objective A: Process challenging emotions in a supportive environment \par Status of Objective: Initial \par Individual Therapy: every 2 weeks. \par \par Problem/Need II: \par Domain for Problem/Need II: Substance Abuse. \par Problem: Alcohol Use. \par Goal (In patient's words): Manage alcohol use. \par Status of Goal: Initial\par Objective A: Learn and utilize effective coping skills so as to prevent increases in alcohol consumption.\par Status of Objective: Initial \par Individual Therapy: every 2 weeks.  [de-identified] : The writer met with the patient for individual psychotherapy via video. The writer and patient explored the patient's experiences on her recent trip and her adjustment to being back home. Interpersonal dynamics and housing desires were explored in the context of her medical health. The patient did not report any SHIIPAVH. The patient was meaningfully engaged and receptive to these interventions. The patient ended the session in good behavioral and emotional control.  [FreeTextEntry1] : 1. The patient will engage in individual psychotherapy every other week. \par 2. The patient will continue to be evaluated by psychiatry as appropriate.

## 2023-03-07 NOTE — DISCUSSION/SUMMARY
[No] : No [Completed, copy requested] : Completed, copy requested [Patient is not prescribed antipsychotic medication] : Patient is not prescribed antipsychotic medication [Patient has been tested for Hepatitis?] : Patient has been tested for hepatitis [Vaccinated?] : is vaccinated [Date of last vaccination (mm/dd/yy):___] : date of last vaccination: [unfilled] [Plan Review] : Plan Review [Able to manage surrounding demands and opportunities] : able to manage surrounding demands and opportunities [Able to exercise self-direction] : able to exercise self-direction [Able to set and pursue goals] : able to set and pursue goals [Articulate] : articulate [Cognitively intact] : cognitively intact [Intelligent] : intelligent [Motivated to participate in treatment] : motivated to participate in treatment [Health literacy] : health literacy [Motivated to maintain or improve physical health] : motivated to maintain or improve physical health [Financially stable] : financially stable [Has vocational interests or hobbies] : has vocational interests or hobbies [Part of a supportive family] : part of a supportive family [Housing stability] : housing stability [High level of education] : high level of education [English fluency] : English fluency [Connected to healthcare] : connected to healthcare [Access to safe outdoor spaces] : access to safe outdoor spaces [Social supports] : social supports [Mental Health] : Mental Health [Substance Abuse] : Substance Abuse [Continued - Progress made] : Continued - Progress made: [Other: ___] : [unfilled] [every ___ weeks] : every [unfilled] weeks [Improvement in symptoms as evidenced by:] : Improvement in symptoms as evidenced by: [Yes] : Yes [Therapist] : Therapist [Supervisor (if needed)] : Supervisor [Does patient use tobacco products?] : Patient does not use tobacco products [Does patient use medical marijuana?] : Patient does not use medical marijuana. [Patient has been tested for HIV?] : Patient has not been tested for HIV [Patient would like to be tested/re-tested?] : patient would not like to be tested/re-tested [Current or past COVID-19 diagnosis?] : Patient does not have a current or past COVID-19 diagnosis [FreeTextEntry5] : Breast Cancer [FreeTextEntry9] : 05/22 [de-identified] : 01/23 [de-identified] : Last 03/23 [de-identified] : Up to date with boosters [FreeTextEntry1] : Alcohol Use [FreeTextEntry4] : Manage alcohol use [de-identified] : Learn and utilize effective coping skills so as to prevent increases in alcohol consumption.  [de-identified] : improved use of effective coping skills; management of alcohol use; reduction of anxiety and depression symptoms.

## 2023-03-07 NOTE — END OF VISIT
[Duration of Psychotherapy Visit (minutes spent in synchronous communication): ____] : Duration of Psychotherapy Visit (minutes spent in synchronous communication): [unfilled] [Individual Psychotherapy for 38-52 minutes] : Individual Psychotherapy for 38 - 52 minutes [Teletherapy Service Provided] : The services provided in this session were delivered via tele-therapy [FreeTextEntry3] : Home [FreeTextEntry5] : [facility]

## 2023-03-07 NOTE — DISCUSSION/SUMMARY
[No] : No [Completed, copy requested] : Completed, copy requested [Patient is not prescribed antipsychotic medication] : Patient is not prescribed antipsychotic medication [Patient has been tested for Hepatitis?] : Patient has been tested for hepatitis [Vaccinated?] : is vaccinated [Date of last vaccination (mm/dd/yy):___] : date of last vaccination: [unfilled] [Plan Review] : Plan Review [Able to manage surrounding demands and opportunities] : able to manage surrounding demands and opportunities [Able to exercise self-direction] : able to exercise self-direction [Able to set and pursue goals] : able to set and pursue goals [Articulate] : articulate [Cognitively intact] : cognitively intact [Intelligent] : intelligent [Motivated to participate in treatment] : motivated to participate in treatment [Health literacy] : health literacy [Motivated to maintain or improve physical health] : motivated to maintain or improve physical health [Financially stable] : financially stable [Has vocational interests or hobbies] : has vocational interests or hobbies [Part of a supportive family] : part of a supportive family [Housing stability] : housing stability [High level of education] : high level of education [English fluency] : English fluency [Connected to healthcare] : connected to healthcare [Access to safe outdoor spaces] : access to safe outdoor spaces [Social supports] : social supports [Mental Health] : Mental Health [Substance Abuse] : Substance Abuse [Continued - Progress made] : Continued - Progress made: [Other: ___] : [unfilled] [every ___ weeks] : every [unfilled] weeks [Improvement in symptoms as evidenced by:] : Improvement in symptoms as evidenced by: [Yes] : Yes [Therapist] : Therapist [Supervisor (if needed)] : Supervisor [Does patient use tobacco products?] : Patient does not use tobacco products [Does patient use medical marijuana?] : Patient does not use medical marijuana. [Patient has been tested for HIV?] : Patient has not been tested for HIV [Patient would like to be tested/re-tested?] : patient would not like to be tested/re-tested [Current or past COVID-19 diagnosis?] : Patient does not have a current or past COVID-19 diagnosis [FreeTextEntry5] : Breast Cancer [FreeTextEntry9] : 05/22 [de-identified] : 01/23 [de-identified] : Last 03/23 [de-identified] : Up to date with boosters [FreeTextEntry1] : Alcohol Use [FreeTextEntry4] : Manage alcohol use [de-identified] : Learn and utilize effective coping skills so as to prevent increases in alcohol consumption.  [de-identified] : improved use of effective coping skills; management of alcohol use; reduction of anxiety and depression symptoms.

## 2023-03-07 NOTE — REASON FOR VISIT
[Telehealth (audio & video) - Individual/Group] : This visit was provided via telehealth using real-time 2-way audio visual technology. [Verbal consent obtained from patient/other participant(s)] : Verbal consent for telehealth/telephonic services obtained from patient/other participant(s) [Home] : at home, [unfilled] , at the time of the visit. [Medical Office: ([facility])___] : at the medical office located in  [Patient] : the patient [Self] : self [FreeTextEntry4] : 1 [FreeTextEntry5] : 145 [FreeTextEntry1] : Experiencing some symptoms of depressed mood and anxiety; alcohol use

## 2023-03-07 NOTE — PHYSICAL EXAM
[0] : 0 [No] : No [4 - 4 or more times a week] : 4 - 4 or more times a week [1 - 3 or 4] : 1 - 3 or 4 [1 - Less than monthly] : 1 - Less than monthly [0 - Never] : 0 - Never [0 - No] : 0 - No [5] : 5 [3] : 3 [6] : 6 [4] : 4 [2 - Monthly] : 2 - Monthly [4 - Yes, during the last year] : 4 - Yes, during the last year [] : No [FreeTextEntry1] : 12 [SchwartzScore] : 52

## 2023-03-15 ENCOUNTER — APPOINTMENT (OUTPATIENT)
Dept: PSYCHIATRY | Facility: CLINIC | Age: 68
End: 2023-03-15

## 2023-03-16 NOTE — ADDENDUM
[FreeTextEntry1] : Case discussed in clinical supervision [[X]] individual [[X]] group (Check one) ASSESSMENT METHOD (Check all that apply):  [[X]] Case presentation  [[X]] Review of Record/Data  [[ ]] Direct Observation  [[ ]] Audio Recording  [[ ]] Video Recording  FOCUS OF SUPERVISION (Check all that apply):  [[X]] Diagnosis & Assessment  [[X]] Intervention  [[ ]] Professional Conduct  [[X]] Culture and Diversity  [[ ]] Scholarly Inquiry    [[ ]] Consultation  [[ ]] Supervision  [[ ]] Administration/Documentation\par \par Supervisor: Narnider Josue, Ph.D.

## 2023-03-16 NOTE — REASON FOR VISIT
[Telehealth (audio & video) - Individual/Group] : This visit was provided via telehealth using real-time 2-way audio visual technology. [Verbal consent obtained from patient/other participant(s)] : Verbal consent for telehealth/telephonic services obtained from patient/other participant(s) [Home] : at home, [unfilled] , at the time of the visit. [Medical Office: ([facility])___] : at the medical office located in  [Patient] : the patient [Self] : self [FreeTextEntry4] : 1 [FreeTextEntry5] : 145 [FreeTextEntry1] : Experiencing some symptoms of depressed mood and anxiety; alcohol use

## 2023-03-16 NOTE — PLAN
[Cognitive and/or Behavior Therapy] : Cognitive and/or Behavior Therapy  [Psychodynamic Therapy] : Psychodynamic Therapy  [Recommended Frequency of Visits: ____] : Recommended frequency of visits: [unfilled] [Return in ____ week(s)] : Return in [unfilled] week(s) [FreeTextEntry2] : Problem/Need I: \par Domain for Problem/Need I: Mental Health. \par Problem: Experiences challenging emotions in response to daily life and her medical illness and treatment. \par Goal (In patient's words): Express challenging emotions. \par Status of Goal: Initial \par Objective A: Process challenging emotions in a supportive environment \par Status of Objective: Initial \par Individual Therapy: every 2 weeks. \par \par Problem/Need II: \par Domain for Problem/Need II: Substance Abuse. \par Problem: Alcohol Use. \par Goal (In patient's words): Manage alcohol use. \par Status of Goal: Initial\par Objective A: Learn and utilize effective coping skills so as to prevent increases in alcohol consumption.\par Status of Objective: Initial \par Individual Therapy: every 2 weeks.  [de-identified] : The writer met with the patient for individual psychotherapy via video. The writer and patient explored her emotions within the context of interpersonal relationships and medical illness. The writer and patient agreed to begin meeting for weekly individual psychotherapy. The patient did not report any SHIIPAVH. The patient was meaningfully engaged and receptive to these interventions. The patient ended the session in good behavioral and emotional control.  [FreeTextEntry1] : 1. The patient will engage in individual psychotherapy every week. \par 2. The patient will continue to be evaluated by psychiatry as appropriate.

## 2023-03-16 NOTE — END OF VISIT
[Duration of Psychotherapy Visit (minutes spent in synchronous communication): ____] : Duration of Psychotherapy Visit (minutes spent in synchronous communication): [unfilled] [Individual Psychotherapy for 38-52 minutes] : Individual Psychotherapy for 38 - 52 minutes [Teletherapy Service Provided] : The services provided in this session were delivered via tele-therapy [FreeTextEntry3] : Home [FreeTextEntry5] : [facility]

## 2023-03-22 ENCOUNTER — NON-APPOINTMENT (OUTPATIENT)
Age: 68
End: 2023-03-22

## 2023-03-27 ENCOUNTER — APPOINTMENT (OUTPATIENT)
Dept: PSYCHIATRY | Facility: CLINIC | Age: 68
End: 2023-03-27

## 2023-03-28 ENCOUNTER — APPOINTMENT (OUTPATIENT)
Dept: PSYCHIATRY | Facility: CLINIC | Age: 68
End: 2023-03-28
Payer: MEDICARE

## 2023-03-28 PROCEDURE — 99213 OFFICE O/P EST LOW 20 MIN: CPT | Mod: 95

## 2023-03-28 NOTE — END OF VISIT
[Duration of Psychotherapy Visit (minutes spent in synchronous communication): ____] : Duration of Psychotherapy Visit (minutes spent in synchronous communication): [unfilled] [Individual Psychotherapy for 38-52 minutes] : Individual Psychotherapy for 38 - 52 minutes [Teletherapy Service Provided] : The services provided in this session were delivered via tele-therapy [FreeTextEntry3] : Home [FreeTextEntry5] : [facility]

## 2023-03-28 NOTE — PLAN
[Cognitive and/or Behavior Therapy] : Cognitive and/or Behavior Therapy  [Psychodynamic Therapy] : Psychodynamic Therapy  [Recommended Frequency of Visits: ____] : Recommended frequency of visits: [unfilled] [Return in ____ week(s)] : Return in [unfilled] week(s) [FreeTextEntry2] : Problem/Need I: \par Domain for Problem/Need I: Mental Health. \par Problem: Experiences challenging emotions in response to daily life and her medical illness and treatment. \par Goal (In patient's words): Express challenging emotions. \par Status of Goal: Initial \par Objective A: Process challenging emotions in a supportive environment \par Status of Objective: Initial \par Individual Therapy: every 2 weeks. \par \par Problem/Need II: \par Domain for Problem/Need II: Substance Abuse. \par Problem: Alcohol Use. \par Goal (In patient's words): Manage alcohol use. \par Status of Goal: Initial\par Objective A: Learn and utilize effective coping skills so as to prevent increases in alcohol consumption.\par Status of Objective: Initial \par Individual Therapy: every 2 weeks.  [de-identified] : The writer met with the patient for individual psychotherapy via video. The writer and patient explored the patient's socialization and interpersonal relationships within the context of her medical health. The relationship between patient alcohol use and relationships was discussed. The patient did not report any SHIIPAVH. The patient was meaningfully engaged and receptive to these interventions. The patient ended the session in good behavioral and emotional control.  [FreeTextEntry1] : 1. The patient will engage in individual psychotherapy every week. \par 2. The patient will continue to be evaluated by psychiatry as appropriate.

## 2023-03-28 NOTE — REASON FOR VISIT
[Telehealth (audio & video) - Individual/Group] : This visit was provided via telehealth using real-time 2-way audio visual technology. [Verbal consent obtained from patient/other participant(s)] : Verbal consent for telehealth/telephonic services obtained from patient/other participant(s) [Home] : at home, [unfilled] , at the time of the visit. [Medical Office: ([facility])___] : at the medical office located in  [Patient] : the patient [Self] : self [FreeTextEntry4] : 1 [FreeTextEntry5] : 145 [FreeTextEntry1] : Experiencing some symptoms of depressed mood and anxiety; alcohol use

## 2023-04-03 ENCOUNTER — APPOINTMENT (OUTPATIENT)
Dept: PSYCHIATRY | Facility: CLINIC | Age: 68
End: 2023-04-03

## 2023-04-05 NOTE — REASON FOR VISIT
[Telehealth (audio & video) - Individual/Group] : This visit was provided via telehealth using real-time 2-way audio visual technology. [Verbal consent obtained from patient/other participant(s)] : Verbal consent for telehealth/telephonic services obtained from patient/other participant(s) [Home] : at home, [unfilled] , at the time of the visit. [Other Location: e.g. Home (Enter Location, City,State)___] : at [unfilled] [Patient] : the patient [Self] : self [FreeTextEntry4] : 1134am [FreeTextEntry5] : [id number] [FreeTextEntry1] : Experiencing some symptoms of depressed mood and anxiety; alcohol use

## 2023-04-05 NOTE — END OF VISIT
[Duration of Psychotherapy Visit (minutes spent in synchronous communication): ____] : Duration of Psychotherapy Visit (minutes spent in synchronous communication): [unfilled] [Individual Psychotherapy for 38-52 minutes] : Individual Psychotherapy for 38 - 52 minutes [Teletherapy Service Provided] : The services provided in this session were delivered via tele-therapy [FreeTextEntry3] : Home [FreeTextEntry5] : Home

## 2023-04-05 NOTE — PLAN
[Cognitive and/or Behavior Therapy] : Cognitive and/or Behavior Therapy  [Psychodynamic Therapy] : Psychodynamic Therapy  [Recommended Frequency of Visits: ____] : Recommended frequency of visits: [unfilled] [Return in ____ week(s)] : Return in [unfilled] week(s) [FreeTextEntry2] : Problem/Need I: \par Domain for Problem/Need I: Mental Health. \par Problem: Experiences challenging emotions in response to daily life and her medical illness and treatment. \par Goal (In patient's words): Express challenging emotions. \par Status of Goal: Initial \par Objective A: Process challenging emotions in a supportive environment \par Status of Objective: Initial \par Individual Therapy: every 2 weeks. \par \par Problem/Need II: \par Domain for Problem/Need II: Substance Abuse. \par Problem: Alcohol Use. \par Goal (In patient's words): Manage alcohol use. \par Status of Goal: Initial\par Objective A: Learn and utilize effective coping skills so as to prevent increases in alcohol consumption.\par Status of Objective: Initial \par Individual Therapy: every 2 weeks.  [de-identified] : The writer met with the patient for individual psychotherapy via video. The writer and patient explored the patient's response to past and present interpersonal relationships. Alcohol use as it relates to these topics was discussed; patient currently appears to be drinking more modestly. The patient did not report any SHIIPAVH. The patient was meaningfully engaged and receptive to these interventions. The patient ended the session in good behavioral and emotional control.  [FreeTextEntry1] : 1. The patient will engage in individual psychotherapy every week. \par 2. The patient will continue to be evaluated by psychiatry as appropriate.

## 2023-04-07 NOTE — DISCUSSION/SUMMARY
[FreeTextEntry1] : Patient is a 66 y/o woman with pph of anxiety, depression, alcohol dependence, referred to our program after her psychiatrist left [facility]. she is currently denying any significant symptoms or depression/anxiety and presents future oriented. She is attending individual psychotherapy and does not currently meet criteria for med management.

## 2023-04-07 NOTE — REASON FOR VISIT
[Patient preference] : as per patient preference [Telehealth (audio & video) - Individual/Group] : This visit was provided via telehealth using real-time 2-way audio visual technology. [Medical Office: ([facility])___] : The provider was located at the medical office in [unfilled]. [Verbal consent obtained from patient/other participant(s)] : Verbal consent for telehealth/telephonic services obtained from patient/other participant(s) [Patient] : Patient [FreeTextEntry1] : follow up for med management

## 2023-04-07 NOTE — HISTORY OF PRESENT ILLNESS
[FreeTextEntry1] : Patient was seen through telehealth. She reports significant improvement in mood and anxiety level since her last visit. She reports that her visit to [place] was very beneficial and she has been doing well with regular individual psychotherapy. She denies any significant symptoms. No SI/HI. She agrees with the plan to continue individual psychotherapy and reach out to the writer, in the future, if needed.

## 2023-04-10 ENCOUNTER — APPOINTMENT (OUTPATIENT)
Dept: PSYCHIATRY | Facility: CLINIC | Age: 68
End: 2023-04-10

## 2023-04-17 ENCOUNTER — APPOINTMENT (OUTPATIENT)
Dept: PSYCHIATRY | Facility: CLINIC | Age: 68
End: 2023-04-17

## 2023-04-19 NOTE — PLAN
[Cognitive and/or Behavior Therapy] : Cognitive and/or Behavior Therapy  [Psychodynamic Therapy] : Psychodynamic Therapy  [Recommended Frequency of Visits: ____] : Recommended frequency of visits: [unfilled] [Return in ____ week(s)] : Return in [unfilled] week(s) [FreeTextEntry2] : Problem/Need I: \par Domain for Problem/Need I: Mental Health. \par Problem: Experiences challenging emotions in response to daily life and her medical illness and treatment. \par Goal (In patient's words): Express challenging emotions. \par Status of Goal: Initial \par Objective A: Process challenging emotions in a supportive environment \par Status of Objective: Initial \par Individual Therapy: every 2 weeks. \par \par Problem/Need II: \par Domain for Problem/Need II: Substance Abuse. \par Problem: Alcohol Use. \par Goal (In patient's words): Manage alcohol use. \par Status of Goal: Initial\par Objective A: Learn and utilize effective coping skills so as to prevent increases in alcohol consumption.\par Status of Objective: Initial \par Individual Therapy: every 2 weeks.  [de-identified] : The writer met with the patient for individual psychotherapy via video. The writer and patient explored friend and familial relationships in the context of her development and her medical health. The patient did not report any SHIIPAVH. The patient was meaningfully engaged and receptive to these interventions. The patient ended the session in good behavioral and emotional control.  [FreeTextEntry1] : 1. The patient will engage in individual psychotherapy every week. \par 2. The patient will continue to be evaluated by psychiatry as appropriate.

## 2023-04-19 NOTE — REASON FOR VISIT
[Telehealth (audio & video) - Individual/Group] : This visit was provided via telehealth using real-time 2-way audio visual technology. [Verbal consent obtained from patient/other participant(s)] : Verbal consent for telehealth/telephonic services obtained from patient/other participant(s) [Home] : at home, [unfilled] , at the time of the visit. [Other Location: e.g. Home (Enter Location, City,State)___] : at [unfilled] [Patient] : the patient [Self] : self [FreeTextEntry4] : 1131am [FreeTextEntry5] : [id number] [FreeTextEntry1] : Experiencing some symptoms of depressed mood and anxiety; alcohol use

## 2023-04-24 ENCOUNTER — APPOINTMENT (OUTPATIENT)
Dept: PSYCHIATRY | Facility: CLINIC | Age: 68
End: 2023-04-24

## 2023-04-25 NOTE — REASON FOR VISIT
[Telehealth (audio & video) - Individual/Group] : This visit was provided via telehealth using real-time 2-way audio visual technology. [Verbal consent obtained from patient/other participant(s)] : Verbal consent for telehealth/telephonic services obtained from patient/other participant(s) [Home] : at home, [unfilled] , at the time of the visit. [Other Location: e.g. Home (Enter Location, City,State)___] : at [unfilled] [Patient] : the patient [Self] : self [FreeTextEntry5] : [id number] [FreeTextEntry4] : 1131am [FreeTextEntry1] : Experiencing some symptoms of depressed mood and anxiety; alcohol use

## 2023-04-25 NOTE — PLAN
[Cognitive and/or Behavior Therapy] : Cognitive and/or Behavior Therapy  [Psychodynamic Therapy] : Psychodynamic Therapy  [Recommended Frequency of Visits: ____] : Recommended frequency of visits: [unfilled] [Return in ____ week(s)] : Return in [unfilled] week(s) [FreeTextEntry2] : Problem/Need I: \par Domain for Problem/Need I: Mental Health. \par Problem: Experiences challenging emotions in response to daily life and her medical illness and treatment. \par Goal (In patient's words): Express challenging emotions. \par Status of Goal: Initial \par Objective A: Process challenging emotions in a supportive environment \par Status of Objective: Initial \par Individual Therapy: every 2 weeks. \par \par Problem/Need II: \par Domain for Problem/Need II: Substance Abuse. \par Problem: Alcohol Use. \par Goal (In patient's words): Manage alcohol use. \par Status of Goal: Initial\par Objective A: Learn and utilize effective coping skills so as to prevent increases in alcohol consumption.\par Status of Objective: Initial \par Individual Therapy: every 2 weeks.  [de-identified] : The writer met with the patient for individual psychotherapy via video. The writer and patient explored the patient's interpersonal relationships in the context of her development and medical health and treatment. The patient did not report any SHIIPAVH. The patient was meaningfully engaged and receptive to these interventions. The patient ended the session in good behavioral and emotional control.  [FreeTextEntry1] : 1. The patient will engage in individual psychotherapy every week. \par 2. The patient will continue to be evaluated by psychiatry as appropriate.

## 2023-04-25 NOTE — PHYSICAL EXAM
[Cooperative] : cooperative [Euthymic] : euthymic [Clear] : clear [Linear/Goal Directed] : linear/goal directed [None] : none [None Reported] : none reported [Average] : average [WNL] : within normal limits [de-identified] : Sad; worried

## 2023-05-01 ENCOUNTER — APPOINTMENT (OUTPATIENT)
Dept: PSYCHIATRY | Facility: CLINIC | Age: 68
End: 2023-05-01

## 2023-05-01 ENCOUNTER — OUTPATIENT (OUTPATIENT)
Dept: OUTPATIENT SERVICES | Facility: HOSPITAL | Age: 68
LOS: 1 days | Discharge: HOME | End: 2023-05-01

## 2023-05-08 ENCOUNTER — APPOINTMENT (OUTPATIENT)
Dept: PSYCHIATRY | Facility: CLINIC | Age: 68
End: 2023-05-08

## 2023-05-15 ENCOUNTER — APPOINTMENT (OUTPATIENT)
Dept: PSYCHIATRY | Facility: CLINIC | Age: 68
End: 2023-05-15

## 2023-05-16 ENCOUNTER — APPOINTMENT (OUTPATIENT)
Dept: PSYCHIATRY | Facility: CLINIC | Age: 68
End: 2023-05-16

## 2023-05-16 NOTE — REASON FOR VISIT
[Telehealth (audio & video) - Individual/Group] : This visit was provided via telehealth using real-time 2-way audio visual technology. [Verbal consent obtained from patient/other participant(s)] : Verbal consent for telehealth/telephonic services obtained from patient/other participant(s) [Home] : at home, [unfilled] , at the time of the visit. [Other Location: e.g. Home (Enter Location, City,State)___] : at [unfilled] [Patient] : the patient [Self] : self [FreeTextEntry4] : 1136am [FreeTextEntry5] : [id number] [FreeTextEntry1] : Experiencing some symptoms of depressed mood and anxiety; alcohol use

## 2023-05-16 NOTE — END OF VISIT
[Individual Psychotherapy for 38-52 minutes] : Individual Psychotherapy for 38 - 52 minutes [Teletherapy Service Provided] : The services provided in this session were delivered via tele-therapy [Duration of Psychotherapy Visit (minutes spent in synchronous communication): ____] : Duration of Psychotherapy Visit (minutes spent in synchronous communication): [unfilled] [Individual Psychotherapy for 16-37 minutes] : Individual Psychotherapy for 16-37 minutes [FreeTextEntry3] : Home [FreeTextEntry5] : Home

## 2023-05-16 NOTE — PLAN
[Cognitive and/or Behavior Therapy] : Cognitive and/or Behavior Therapy  [Psychodynamic Therapy] : Psychodynamic Therapy  [Recommended Frequency of Visits: ____] : Recommended frequency of visits: [unfilled] [Return in ____ week(s)] : Return in [unfilled] week(s) [FreeTextEntry2] : Problem/Need I: \par Domain for Problem/Need I: Mental Health. \par Problem: Experiences challenging emotions in response to daily life and her medical illness and treatment. \par Goal (In patient's words): Express challenging emotions. \par Status of Goal: Initial \par Objective A: Process challenging emotions in a supportive environment \par Status of Objective: Initial \par Individual Therapy: every 2 weeks. \par \par Problem/Need II: \par Domain for Problem/Need II: Substance Abuse. \par Problem: Alcohol Use. \par Goal (In patient's words): Manage alcohol use. \par Status of Goal: Initial\par Objective A: Learn and utilize effective coping skills so as to prevent increases in alcohol consumption.\par Status of Objective: Initial \par Individual Therapy: every 2 weeks.  [de-identified] : The writer met with the patient for individual psychotherapy via video. The writer and patient explored the patient's response to her medical health/treatment as well as interpersonal relationships. The patient did not report any SHIIPAVH. The patient was meaningfully engaged and receptive to these interventions. The patient ended the session in good behavioral and emotional control.  [FreeTextEntry1] : 1. The patient will engage in individual psychotherapy every week. \par 2. The patient will continue to be evaluated by psychiatry as appropriate.

## 2023-05-16 NOTE — PHYSICAL EXAM
[Cooperative] : cooperative [Euthymic] : euthymic [Clear] : clear [Linear/Goal Directed] : linear/goal directed [None] : none [None Reported] : none reported [Average] : average [WNL] : within normal limits [de-identified] : anxious

## 2023-05-17 NOTE — PHYSICAL EXAM
[Cooperative] : cooperative [Euthymic] : euthymic [Clear] : clear [Linear/Goal Directed] : linear/goal directed [None] : none [None Reported] : none reported [Average] : average [WNL] : within normal limits [de-identified] : anxious

## 2023-05-17 NOTE — REASON FOR VISIT
[Telehealth (audio & video) - Individual/Group] : This visit was provided via telehealth using real-time 2-way audio visual technology. [Verbal consent obtained from patient/other participant(s)] : Verbal consent for telehealth/telephonic services obtained from patient/other participant(s) [Home] : at home, [unfilled] , at the time of the visit. [Other Location: e.g. Home (Enter Location, City,State)___] : at [unfilled] [Patient] : the patient [Self] : self [FreeTextEntry4] : 1131am [FreeTextEntry5] : [id number] [FreeTextEntry1] : Experiencing some symptoms of depressed mood and anxiety; alcohol use

## 2023-05-17 NOTE — PLAN
[Cognitive and/or Behavior Therapy] : Cognitive and/or Behavior Therapy  [Psychodynamic Therapy] : Psychodynamic Therapy  [Recommended Frequency of Visits: ____] : Recommended frequency of visits: [unfilled] [Return in ____ week(s)] : Return in [unfilled] week(s) [FreeTextEntry2] : Problem/Need I: \par Domain for Problem/Need I: Mental Health. \par Problem: Experiences challenging emotions in response to daily life and her medical illness and treatment. \par Goal (In patient's words): Express challenging emotions. \par Status of Goal: Initial \par Objective A: Process challenging emotions in a supportive environment \par Status of Objective: Initial \par Individual Therapy: every 2 weeks. \par \par Problem/Need II: \par Domain for Problem/Need II: Substance Abuse. \par Problem: Alcohol Use. \par Goal (In patient's words): Manage alcohol use. \par Status of Goal: Initial\par Objective A: Learn and utilize effective coping skills so as to prevent increases in alcohol consumption.\par Status of Objective: Initial \par Individual Therapy: every 2 weeks.  [de-identified] : The writer met with the patient for individual psychotherapy via video. The writer and patient explored the patient's interpersonal relationships in the context of her medical health and treatment. Alcohol use continued to be monitored and discussed, with the writer and patient reflecting on potential contributors and an effective replacement behavior. The patient did not report any SHIIPAVH. The patient was meaningfully engaged and receptive to these interventions. The patient ended the session in good behavioral and emotional control.  [FreeTextEntry1] : 1. The patient will engage in individual psychotherapy every week. \par 2. The patient will continue to be evaluated by psychiatry as appropriate.

## 2023-05-17 NOTE — END OF VISIT
[Duration of Psychotherapy Visit (minutes spent in synchronous communication): ____] : Duration of Psychotherapy Visit (minutes spent in synchronous communication): [unfilled] [Individual Psychotherapy for 16-37 minutes] : Individual Psychotherapy for 16-37 minutes [Individual Psychotherapy for 38-52 minutes] : Individual Psychotherapy for 38 - 52 minutes [Teletherapy Service Provided] : The services provided in this session were delivered via tele-therapy [FreeTextEntry3] : Home [FreeTextEntry5] : Home

## 2023-05-17 NOTE — ADDENDUM
[FreeTextEntry1] : Case discussed in clinical supervision [[X]] individual [[X]] group (Check one) ASSESSMENT METHOD (Check all that apply):  [[X]] Case presentation  [[X]] Review of Record/Data  [[ ]] Direct Observation  [[ ]] Audio Recording  [[ ]] Video Recording  FOCUS OF SUPERVISION (Check all that apply):  [[X]] Diagnosis & Assessment  [[X]] Intervention  [[ ]] Professional Conduct  [[X]] Culture and Diversity  [[ ]] Scholarly Inquiry    [[ ]] Consultation  [[ ]] Supervision  [[ ]] Administration/Documentation\par \par Supervisor: Narinder Josue, Ph.D.; Flo Dorado, Ph.D.

## 2023-05-22 ENCOUNTER — APPOINTMENT (OUTPATIENT)
Dept: PSYCHIATRY | Facility: CLINIC | Age: 68
End: 2023-05-22

## 2023-05-22 NOTE — PHYSICAL EXAM
[Cooperative] : cooperative [Euthymic] : euthymic [Full] : full [Clear] : clear [Linear/Goal Directed] : linear/goal directed [None] : none [None Reported] : none reported [Average] : average [WNL] : within normal limits Private car

## 2023-05-24 NOTE — REASON FOR VISIT
[Telehealth (audio & video) - Individual/Group] : This visit was provided via telehealth using real-time 2-way audio visual technology. [Medical Office: ([facility])___] : The provider was located at the medical office in [unfilled]. [Verbal consent obtained from patient/other participant(s)] : Verbal consent for telehealth/telephonic services obtained from patient/other participant(s) [Home] : at home, [unfilled] , at the time of the visit. [Other Location: e.g. Home (Enter Location, City,State)___] : at [unfilled] [Patient] : the patient [Self] : self [FreeTextEntry4] : 1135am [FreeTextEntry5] : 1783wr [FreeTextEntry1] : Experiencing some symptoms of depressed mood and anxiety; alcohol use

## 2023-05-24 NOTE — PLAN
[Cognitive and/or Behavior Therapy] : Cognitive and/or Behavior Therapy  [Psychodynamic Therapy] : Psychodynamic Therapy  [Recommended Frequency of Visits: ____] : Recommended frequency of visits: [unfilled] [Return in ____ week(s)] : Return in [unfilled] week(s) [FreeTextEntry2] : Problem/Need I: \par Domain for Problem/Need I: Mental Health. \par Problem: Experiences challenging emotions in response to daily life and her medical illness and treatment. \par Goal (In patient's words): Express challenging emotions. \par Status of Goal: Initial \par Objective A: Process challenging emotions in a supportive environment \par Status of Objective: Initial \par Individual Therapy: every 2 weeks. \par \par Problem/Need II: \par Domain for Problem/Need II: Substance Abuse. \par Problem: Alcohol Use. \par Goal (In patient's words): Manage alcohol use. \par Status of Goal: Initial\par Objective A: Learn and utilize effective coping skills so as to prevent increases in alcohol consumption.\par Status of Objective: Initial \par Individual Therapy: every 2 weeks.  [de-identified] : The writer met with the patient for individual psychotherapy via video. The writer and patient explored interpersonal relationships as it pertains to her medical health and adjustment. The patient did not report any SHIIPAVH. The patient was meaningfully engaged and receptive to these interventions. The patient ended the session in good behavioral and emotional control.  [FreeTextEntry1] : 1. The patient will engage in individual psychotherapy every week. \par 2. The patient will continue to be evaluated by psychiatry as appropriate.

## 2023-05-30 ENCOUNTER — APPOINTMENT (OUTPATIENT)
Dept: PSYCHIATRY | Facility: CLINIC | Age: 68
End: 2023-05-30

## 2023-05-31 NOTE — REASON FOR VISIT
[Telehealth (audio & video) - Individual/Group] : This visit was provided via telehealth using real-time 2-way audio visual technology. [Verbal consent obtained from patient/other participant(s)] : Verbal consent for telehealth/telephonic services obtained from patient/other participant(s) [Home] : at home, [unfilled] , at the time of the visit. [Other Location: e.g. Home (Enter Location, City,State)___] : at [unfilled] [Patient] : the patient [Self] : self [FreeTextEntry4] : 4 [FreeTextEntry5] : 445pm [FreeTextEntry1] : Experiencing some symptoms of depressed mood and anxiety; alcohol use

## 2023-05-31 NOTE — PLAN
[Cognitive and/or Behavior Therapy] : Cognitive and/or Behavior Therapy  [Psychodynamic Therapy] : Psychodynamic Therapy  [Recommended Frequency of Visits: ____] : Recommended frequency of visits: [unfilled] [Return in ____ week(s)] : Return in [unfilled] week(s) [FreeTextEntry2] : Problem/Need I: \par Domain for Problem/Need I: Mental Health. \par Problem: Experiences challenging emotions in response to daily life and her medical illness and treatment. \par Goal (In patient's words): Express challenging emotions. \par Status of Goal: Initial \par Objective A: Process challenging emotions in a supportive environment \par Status of Objective: Initial \par Individual Therapy: every 2 weeks. \par \par Problem/Need II: \par Domain for Problem/Need II: Substance Abuse. \par Problem: Alcohol Use. \par Goal (In patient's words): Manage alcohol use. \par Status of Goal: Initial\par Objective A: Learn and utilize effective coping skills so as to prevent increases in alcohol consumption.\par Status of Objective: Initial \par Individual Therapy: every 2 weeks.  [de-identified] : The writer met with the patient for individual psychotherapy via video. The writer and patient processed interpersonal relationships and self-perception in the context of her medical health and treatment. The patient did not report any SHIIPAVH. The patient was meaningfully engaged and receptive to these interventions. The patient ended the session in good behavioral and emotional control.  [FreeTextEntry1] : 1. The patient will engage in individual psychotherapy every week. \par 2. The patient will continue to be evaluated by psychiatry as appropriate.

## 2023-06-05 ENCOUNTER — APPOINTMENT (OUTPATIENT)
Dept: PSYCHIATRY | Facility: CLINIC | Age: 68
End: 2023-06-05

## 2023-06-12 ENCOUNTER — APPOINTMENT (OUTPATIENT)
Dept: PSYCHIATRY | Facility: CLINIC | Age: 68
End: 2023-06-12

## 2023-06-12 DIAGNOSIS — F20.9 SCHIZOPHRENIA, UNSPECIFIED: ICD-10-CM

## 2023-06-12 NOTE — PHYSICAL EXAM
[Cooperative] : cooperative [Euthymic] : euthymic [Clear] : clear [Full] : full [Linear/Goal Directed] : linear/goal directed [None] : none [None Reported] : none reported [Average] : average [WNL] : within normal limits

## 2023-06-14 NOTE — PLAN
[Cognitive and/or Behavior Therapy] : Cognitive and/or Behavior Therapy  [Psychodynamic Therapy] : Psychodynamic Therapy  [Recommended Frequency of Visits: ____] : Recommended frequency of visits: [unfilled] [Return in ____ week(s)] : Return in [unfilled] week(s) [FreeTextEntry2] : Problem/Need I: \par Domain for Problem/Need I: Mental Health. \par Problem: Experiences challenging emotions in response to daily life and her medical illness and treatment. \par Goal (In patient's words): Express challenging emotions. \par Status of Goal: Initial \par Objective A: Process challenging emotions in a supportive environment \par Status of Objective: Initial \par Individual Therapy: every 2 weeks. \par \par Problem/Need II: \par Domain for Problem/Need II: Substance Abuse. \par Problem: Alcohol Use. \par Goal (In patient's words): Manage alcohol use. \par Status of Goal: Initial\par Objective A: Learn and utilize effective coping skills so as to prevent increases in alcohol consumption.\par Status of Objective: Initial \par Individual Therapy: every 2 weeks.  [de-identified] : The writer met with the patient for individual psychotherapy via video. The writer and patient reflected on the patient's interpersonal relationships and their impact on her mood and alcohol use, exploring the patient's interpersonal desires within the context of her medical health. The patient did not report any SHIIPAVH. The patient was meaningfully engaged and receptive to these interventions. The patient ended the session in good behavioral and emotional control.  [FreeTextEntry1] : 1. The patient will engage in individual psychotherapy every week. \par 2. The patient will continue to be evaluated by psychiatry as appropriate.

## 2023-06-14 NOTE — PLAN
[Cognitive and/or Behavior Therapy] : Cognitive and/or Behavior Therapy  [Psychodynamic Therapy] : Psychodynamic Therapy  [Recommended Frequency of Visits: ____] : Recommended frequency of visits: [unfilled] [Return in ____ week(s)] : Return in [unfilled] week(s) [FreeTextEntry2] : Problem/Need I: \par Domain for Problem/Need I: Mental Health. \par Problem: Experiences challenging emotions in response to daily life and her medical illness and treatment. \par Goal (In patient's words): Express challenging emotions. \par Status of Goal: Initial \par Objective A: Process challenging emotions in a supportive environment \par Status of Objective: Initial \par Individual Therapy: every 2 weeks. \par \par Problem/Need II: \par Domain for Problem/Need II: Substance Abuse. \par Problem: Alcohol Use. \par Goal (In patient's words): Manage alcohol use. \par Status of Goal: Initial\par Objective A: Learn and utilize effective coping skills so as to prevent increases in alcohol consumption.\par Status of Objective: Initial \par Individual Therapy: every 2 weeks.  [de-identified] : The writer met with the patient for individual psychotherapy via video. The writer and patient processed the patient's interpersonal relationships, with the patient reflecting on behavioral pattern and current interpersonal desires and related emotions. The patient did not report any SHIIPAVH. The patient was meaningfully engaged and receptive to these interventions. The patient ended the session in good behavioral and emotional control.  [FreeTextEntry1] : 1. The patient will engage in individual psychotherapy every week. \par 2. The patient will continue to be evaluated by psychiatry as appropriate.

## 2023-06-19 ENCOUNTER — APPOINTMENT (OUTPATIENT)
Dept: PSYCHIATRY | Facility: CLINIC | Age: 68
End: 2023-06-19

## 2023-06-26 ENCOUNTER — APPOINTMENT (OUTPATIENT)
Dept: PSYCHIATRY | Facility: CLINIC | Age: 68
End: 2023-06-26

## 2023-06-27 ENCOUNTER — APPOINTMENT (OUTPATIENT)
Dept: PSYCHIATRY | Facility: CLINIC | Age: 68
End: 2023-06-27

## 2023-06-28 NOTE — PLAN
[Cognitive and/or Behavior Therapy] : Cognitive and/or Behavior Therapy  [Psychodynamic Therapy] : Psychodynamic Therapy  [Recommended Frequency of Visits: ____] : Recommended frequency of visits: [unfilled] [Return in ____ week(s)] : Return in [unfilled] week(s) [FreeTextEntry2] : Problem/Need I: \par Domain for Problem/Need I: Mental Health. \par Problem: Experiences challenging emotions in response to daily life and her medical illness and treatment. \par Goal (In patient's words): Express challenging emotions. \par Status of Goal: Initial \par Objective A: Process challenging emotions in a supportive environment \par Status of Objective: Initial \par Individual Therapy: every 2 weeks. \par \par Problem/Need II: \par Domain for Problem/Need II: Substance Abuse. \par Problem: Alcohol Use. \par Goal (In patient's words): Manage alcohol use. \par Status of Goal: Initial\par Objective A: Learn and utilize effective coping skills so as to prevent increases in alcohol consumption.\par Status of Objective: Initial \par Individual Therapy: every 2 weeks.  [de-identified] : The writer met with the patient for individual psychotherapy via video. The writer and patient explored the role of gender in interpersonal dynamics. The writer and patient began discussing completing treatment together in August. The patient did not report any SHIIPAVH. The patient was meaningfully engaged and receptive to these interventions. The patient ended the session in good behavioral and emotional control.  [FreeTextEntry1] : 1. The patient will engage in individual psychotherapy every week. \par 2. The patient will continue to be evaluated by psychiatry as appropriate.

## 2023-06-28 NOTE — PLAN
[Cognitive and/or Behavior Therapy] : Cognitive and/or Behavior Therapy  [Psychodynamic Therapy] : Psychodynamic Therapy  [Recommended Frequency of Visits: ____] : Recommended frequency of visits: [unfilled] [Return in ____ week(s)] : Return in [unfilled] week(s) [FreeTextEntry2] : Problem/Need I: \par Domain for Problem/Need I: Mental Health. \par Problem: Experiences challenging emotions in response to daily life and her medical illness and treatment. \par Goal (In patient's words): Express challenging emotions. \par Status of Goal: Initial \par Objective A: Process challenging emotions in a supportive environment \par Status of Objective: Initial \par Individual Therapy: every 2 weeks. \par \par Problem/Need II: \par Domain for Problem/Need II: Substance Abuse. \par Problem: Alcohol Use. \par Goal (In patient's words): Manage alcohol use. \par Status of Goal: Initial\par Objective A: Learn and utilize effective coping skills so as to prevent increases in alcohol consumption.\par Status of Objective: Initial \par Individual Therapy: every 2 weeks.  [de-identified] : The writer met with the patient for individual psychotherapy via video. The writer and patient explored the interpersonal nature of the patient's illness through discussion of a related newspaper article recently published. Identity and contrasting emotions were explored. The patient did not report any SHIIPAVH. The patient was meaningfully engaged and receptive to these interventions. The patient ended the session in good behavioral and emotional control.  [FreeTextEntry1] : 1. The patient will engage in individual psychotherapy every week. \par 2. The patient will continue to be evaluated by psychiatry as appropriate.

## 2023-07-10 ENCOUNTER — APPOINTMENT (OUTPATIENT)
Dept: PSYCHIATRY | Facility: CLINIC | Age: 68
End: 2023-07-10

## 2023-07-11 NOTE — REASON FOR VISIT
[Telehealth (audio & video) - Individual/Group] : This visit was provided via telehealth using real-time 2-way audio visual technology. [Verbal consent obtained from patient/other participant(s)] : Verbal consent for telehealth/telephonic services obtained from patient/other participant(s) [Home] : at home, [unfilled] , at the time of the visit. [Other Location: e.g. Home (Enter Location, City,State)___] : at [unfilled] [Patient] : the patient [Self] : self [FreeTextEntry4] : 1137am [FreeTextEntry5] : 121pm [FreeTextEntry1] : Experiencing some symptoms of depressed mood and anxiety; alcohol use

## 2023-07-11 NOTE — PLAN
[Cognitive and/or Behavior Therapy] : Cognitive and/or Behavior Therapy  [Psychodynamic Therapy] : Psychodynamic Therapy  [Recommended Frequency of Visits: ____] : Recommended frequency of visits: [unfilled] [Return in ____ week(s)] : Return in [unfilled] week(s) [FreeTextEntry2] : Problem/Need I: \par Domain for Problem/Need I: Mental Health. \par Problem: Experiences challenging emotions in response to daily life and her medical illness and treatment. \par Goal (In patient's words): Express challenging emotions. \par Status of Goal: Initial \par Objective A: Process challenging emotions in a supportive environment \par Status of Objective: Initial \par Individual Therapy: every 2 weeks. \par \par Problem/Need II: \par Domain for Problem/Need II: Substance Abuse. \par Problem: Alcohol Use. \par Goal (In patient's words): Manage alcohol use. \par Status of Goal: Initial\par Objective A: Learn and utilize effective coping skills so as to prevent increases in alcohol consumption.\par Status of Objective: Initial \par Individual Therapy: every 2 weeks.  [de-identified] : The writer met with the patient for individual psychotherapy via video. The writer and patient explored interpersonal dynamics in the context of her health and close relationships. The patient did not report any SHIIPAVH. The patient was meaningfully engaged and receptive to these interventions. The patient ended the session in good behavioral and emotional control.  [FreeTextEntry1] : 1. The patient will engage in individual psychotherapy every week. \par 2. The patient will continue to be evaluated by psychiatry as appropriate.

## 2023-07-17 ENCOUNTER — APPOINTMENT (OUTPATIENT)
Dept: PSYCHIATRY | Facility: CLINIC | Age: 68
End: 2023-07-17

## 2023-07-18 NOTE — PLAN
[Yes. details: ___] : Yes, [unfilled] [Medication education provided] : Medication education provided. [Rationale for medication choices, possible risks/precautions, benefits, alternative treatment choices, and consequences of non-treatment discussed] : Rationale for medication choices, possible risks/precautions, benefits, alternative treatment choices, and consequences of non-treatment discussed with patient/family/caregiver  [FreeTextEntry4] : anxiety/depressive symptoms [FreeTextEntry5] : -pt was recommended to start lexapro 5mg po daily but decided not to take it\par -pt to meet again with the writer in 1-2 months to review her symptoms and the benefits of starting an SSRI

## 2023-07-18 NOTE — RISK ASSESSMENT
[No, patient denies ideation or behavior] : No, patient denies ideation or behavior [Low acute suicide risk] : Low acute suicide risk [No] : No [Not clinically indicated] : Safety Plan completed/updated (for individuals at risk): Not clinically indicated [FreeTextEntry9] : low acute risk

## 2023-07-18 NOTE — DISCUSSION/SUMMARY
[FreeTextEntry1] : Pt is a 66 y/o woman with metastatic breast cancer and with pph of anxiety, depression, alcohol dependence, referred to our program after her psychiatrist left [facility]. Patient is currently seeing dr Chavez q2 weeks for individual psychotherapy. Pt reports that she decided not to start lexapro due to general improvement in symptoms and also concerns of taking it while she continues to use alcohol regularly. Pt to meet with the writer again in 1-2 months to review her symptoms again.

## 2023-07-18 NOTE — REASON FOR VISIT
[Patient preference] : as per patient preference [Patient] : Patient [FreeTextEntry1] : follow up for medication management

## 2023-07-18 NOTE — HISTORY OF PRESENT ILLNESS
[FreeTextEntry1] : Patient was seen through teleheath. She reports that she decided not to start lexapro for anxiety for several reasons. Pt reports that her mood and anxiety improved after she was reassured about her response to treatment, went to [place] where she has been more social and physically active (plays golf) and she attends regular therapy sessions.  Pt also felt uncomfortable starting lexapro while she was still using daily alcohol (the writer provided education on this matter). She reports that she has been working on cutting down but her goal is not to stop. She denies any SI/HI/AVH/PI. \par Patient agrees with the plan to meet again with the writer after she returns from [place] to review her symptoms and the benefits of starting a SSRI.  [FreeTextEntry3] : lexapro 5mg po daily- pt is not currently taking it

## 2023-07-19 NOTE — PLAN
[Cognitive and/or Behavior Therapy] : Cognitive and/or Behavior Therapy  [Psychodynamic Therapy] : Psychodynamic Therapy  [Recommended Frequency of Visits: ____] : Recommended frequency of visits: [unfilled] [Return in ____ week(s)] : Return in [unfilled] week(s) [FreeTextEntry2] : Problem/Need I: \par Domain for Problem/Need I: Mental Health. \par Problem: Experiences challenging emotions in response to daily life and her medical illness and treatment. \par Goal (In patient's words): Express challenging emotions. \par Status of Goal: Initial \par Objective A: Process challenging emotions in a supportive environment \par Status of Objective: Initial \par Individual Therapy: every 2 weeks. \par \par Problem/Need II: \par Domain for Problem/Need II: Substance Abuse. \par Problem: Alcohol Use. \par Goal (In patient's words): Manage alcohol use. \par Status of Goal: Initial\par Objective A: Learn and utilize effective coping skills so as to prevent increases in alcohol consumption.\par Status of Objective: Initial \par Individual Therapy: every 2 weeks.  [de-identified] : The writer met with the patient for individual psychotherapy via video. The writer and patient explored interpersonal dynamics, through discussion of several relationships. Patient continues to be/make plans for behavioral activity. The patient did not report any SHIIPAVH. The patient was meaningfully engaged and receptive to these interventions. The patient ended the session in good behavioral and emotional control.  [FreeTextEntry1] : 1. The patient will engage in individual psychotherapy every week. \par 2. The patient will continue to be evaluated by psychiatry as appropriate.

## 2023-07-19 NOTE — REASON FOR VISIT
[Telehealth (audio & video) - Individual/Group] : This visit was provided via telehealth using real-time 2-way audio visual technology. [Verbal consent obtained from patient/other participant(s)] : Verbal consent for telehealth/telephonic services obtained from patient/other participant(s) [Home] : at home, [unfilled] , at the time of the visit. [Other Location: e.g. Home (Enter Location, City,State)___] : at [unfilled] [Patient] : the patient [Self] : self [FreeTextEntry4] : 1132am [FreeTextEntry5] : 1210pm [FreeTextEntry1] : Experiencing some symptoms of depressed mood and anxiety; alcohol use

## 2023-07-24 ENCOUNTER — APPOINTMENT (OUTPATIENT)
Dept: PSYCHIATRY | Facility: CLINIC | Age: 68
End: 2023-07-24

## 2023-07-27 NOTE — REASON FOR VISIT
[Telehealth (audio & video) - Individual/Group] : This visit was provided via telehealth using real-time 2-way audio visual technology. [Verbal consent obtained from patient/other participant(s)] : Verbal consent for telehealth/telephonic services obtained from patient/other participant(s) [Home] : at home, [unfilled] , at the time of the visit. [Other Location: e.g. Home (Enter Location, City,State)___] : at [unfilled] [Patient] : the patient [Self] : self [FreeTextEntry4] : 1136am [FreeTextEntry5] : 12pm [FreeTextEntry1] : Experiencing some symptoms of depressed mood and anxiety; alcohol use

## 2023-07-27 NOTE — PLAN
[Cognitive and/or Behavior Therapy] : Cognitive and/or Behavior Therapy  [Psychodynamic Therapy] : Psychodynamic Therapy  [Recommended Frequency of Visits: ____] : Recommended frequency of visits: [unfilled] [Return in ____ week(s)] : Return in [unfilled] week(s) [FreeTextEntry2] : Problem/Need I: \par Domain for Problem/Need I: Mental Health. \par Problem: Experiences challenging emotions in response to daily life and her medical illness and treatment. \par Goal (In patient's words): Express challenging emotions. \par Status of Goal: Initial \par Objective A: Process challenging emotions in a supportive environment \par Status of Objective: Initial \par Individual Therapy: every 2 weeks. \par \par Problem/Need II: \par Domain for Problem/Need II: Substance Abuse. \par Problem: Alcohol Use. \par Goal (In patient's words): Manage alcohol use. \par Status of Goal: Initial\par Objective A: Learn and utilize effective coping skills so as to prevent increases in alcohol consumption.\par Status of Objective: Initial \par Individual Therapy: every 2 weeks.  [de-identified] : The writer met with the patient for individual psychotherapy via video. The writer and patient reflected on the patient's treatment, discussing growth and potential areas of focus. Interpersonal relationships and dynamics discussed throughout. The patient did not report any SHIIPAVH. The patient was meaningfully engaged and receptive to these interventions. The patient ended the session in good behavioral and emotional control.  [FreeTextEntry1] : 1. The patient will engage in individual psychotherapy every week. \par 2. The patient will continue to be evaluated by psychiatry as appropriate.

## 2023-07-31 ENCOUNTER — APPOINTMENT (OUTPATIENT)
Dept: PSYCHIATRY | Facility: CLINIC | Age: 68
End: 2023-07-31
Payer: MEDICARE

## 2023-07-31 PROCEDURE — ZZZZZ: CPT

## 2023-08-07 ENCOUNTER — APPOINTMENT (OUTPATIENT)
Dept: PSYCHIATRY | Facility: CLINIC | Age: 68
End: 2023-08-07

## 2023-08-08 ENCOUNTER — NON-APPOINTMENT (OUTPATIENT)
Age: 68
End: 2023-08-08

## 2023-08-08 ENCOUNTER — APPOINTMENT (OUTPATIENT)
Dept: OPHTHALMOLOGY | Facility: CLINIC | Age: 68
End: 2023-08-08
Payer: MEDICARE

## 2023-08-08 PROCEDURE — 92014 COMPRE OPH EXAM EST PT 1/>: CPT

## 2023-08-10 ENCOUNTER — APPOINTMENT (OUTPATIENT)
Dept: ORTHOPEDIC SURGERY | Facility: CLINIC | Age: 68
End: 2023-08-10
Payer: MEDICARE

## 2023-08-10 DIAGNOSIS — S76.311A STRAIN OF MUSCLE, FASCIA AND TENDON OF THE POSTERIOR MUSCLE GROUP AT THIGH LEVEL, RIGHT THIGH, INITIAL ENCOUNTER: ICD-10-CM

## 2023-08-10 DIAGNOSIS — S46.012A STRAIN OF MUSCLE(S) AND TENDON(S) OF THE ROTATOR CUFF OF LEFT SHOULDER, INITIAL ENCOUNTER: ICD-10-CM

## 2023-08-10 DIAGNOSIS — M75.42 IMPINGEMENT SYNDROME OF LEFT SHOULDER: ICD-10-CM

## 2023-08-10 DIAGNOSIS — C50.911 MALIGNANT NEOPLASM OF UNSPECIFIED SITE OF RIGHT FEMALE BREAST: ICD-10-CM

## 2023-08-10 PROCEDURE — 99214 OFFICE O/P EST MOD 30 MIN: CPT

## 2023-08-10 PROCEDURE — 73502 X-RAY EXAM HIP UNI 2-3 VIEWS: CPT | Mod: RT

## 2023-08-10 PROCEDURE — 73030 X-RAY EXAM OF SHOULDER: CPT | Mod: LT

## 2023-08-10 NOTE — ASSESSMENT
[FreeTextEntry1] : 66 y/o female with several months of left shoulder pain likely rotator cuff tendinopathy and may have a small tear of the rotator cuff.  She has good motion and good strength so overall of the suspected significant tear and would not recommend getting an MRI at this time.  If she has ongoing pain after trying some physical therapy she should get an MRI.  She will trial physical therapy first and ibuprofen.  I may consider steroid injection. She also has pain in her right posterior hip in the region of the piriformis which may be a strain of the piriformis.  She should do physical therapy for that as well.  She is going away next month so she needs to recover before going.  Hiking poles may help with her walking while she is away if it still sore.  Again she can take ibuprofen if needed or Tylenol. Follow-up in 6 to 8 weeks if she is not better in which case we will workup further.  If her shoulder is continuing to hurt she should get the MRI before next visit.

## 2023-08-10 NOTE — PHYSICAL EXAM
[UE] : Sensory: Intact in bilateral upper extremities [Normal RUE] : Right Upper Extremity: No scars, rashes, lesions, ulcers, skin intact [Normal LUE] : Left Upper Extremity: No scars, rashes, lesions, ulcers, skin intact [Normal Touch] : sensation intact for touch [Normal] : Gait: normal [UE/LE] : Sensory: Intact in bilateral upper & lower extremities [Rad] : radial 2+ and symmetric bilaterally [de-identified] : Left shoulder: Normal appearance shoulders AROM: 180 FE a few degrees less right than left, IR to T8 vs T7, 180 abd. PROM: 180 FE, 70 ER at the side, 90 ER and 35 IR in the 90 degree abducted position. Motor:  5/5  supraspinatus,  5/5 ER, 5/5 IR, 5/5 biceps, 5/5 deltoid.  Normal lift off test Mild pain with Neer and Wheeler. -  X-arm.   - [place]'s, - Speeds. nontender over the biceps tendon and the anterior shoulder.   No scapular winging. Sensation is intact distally in the UE. Skin is intact in the UE.  Intact Motor distally.  Right hip Normal gait. Tender in the right sciatic notch/piriformis. Nontender greater trochanter. Negative straight leg raise. Nontender lumbar spine. Mild pain with piriformis stretch. Hip range of motion is with about 130 degrees flexion and 10 to 15 degrees internal rotation and 40 degrees of external rotation.  [de-identified] : No respiratory distress or cough [de-identified] : X-rays of  Left shoulder 3 views today showed no significant arthritis.  No definite bone lesions although there was some very tiny areas of osteopenia in the humeral shaft. No calcifications.  No evidence of arthritis glenohumeral joint. . X-rays of the right hip AP and lateral views showed no significant deep joint arthritis and no bone lesions.
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
## 2023-08-10 NOTE — REASON FOR VISIT
[Follow-Up Visit] : a follow-up visit for [Shoulder Pain] : shoulder pain [FreeTextEntry2] : hip pain

## 2023-08-10 NOTE — CONSULT LETTER
[Dear  ___] : Dear  [unfilled], [Consult Letter:] : I had the pleasure of evaluating your patient, [unfilled]. [Please see my note below.] : Please see my note below. [Consult Closing:] : Thank you very much for allowing me to participate in the care of this patient.  If you have any questions, please do not hesitate to contact me. [Sincerely,] : Sincerely, [FreeTextEntry2] : Alan Dechiario, MD [FreeTextEntry3] : Nazia Mckenzie MD

## 2023-08-14 ENCOUNTER — APPOINTMENT (OUTPATIENT)
Dept: PSYCHIATRY | Facility: CLINIC | Age: 68
End: 2023-08-14

## 2023-08-14 NOTE — REASON FOR VISIT
[Telehealth (audio & video) - Individual/Group] : This visit was provided via telehealth using real-time 2-way audio visual technology. [Verbal consent obtained from patient/other participant(s)] : Verbal consent for telehealth/telephonic services obtained from patient/other participant(s) [Home] : at home, [unfilled] , at the time of the visit. [Other Location: e.g. Home (Enter Location, City,State)___] : at [unfilled] [Patient] : the patient [Self] : self [FreeTextEntry4] : 1131am [FreeTextEntry5] : 1210pm [FreeTextEntry1] : Experiencing some symptoms of depressed mood and anxiety; alcohol use

## 2023-08-14 NOTE — PLAN
[Cognitive and/or Behavior Therapy] : Cognitive and/or Behavior Therapy  [Psychodynamic Therapy] : Psychodynamic Therapy  [Recommended Frequency of Visits: ____] : Recommended frequency of visits: [unfilled] [Return in ____ week(s)] : Return in [unfilled] week(s) [FreeTextEntry2] : Problem/Need I: \par  Domain for Problem/Need I: Mental Health. \par  Problem: Experiences challenging emotions in response to daily life and her medical illness and treatment. \par  Goal (In patient's words): Express challenging emotions. \par  Status of Goal: Initial \par  Objective A: Process challenging emotions in a supportive environment \par  Status of Objective: Initial \par  Individual Therapy: every 2 weeks. \par  \par  Problem/Need II: \par  Domain for Problem/Need II: Substance Abuse. \par  Problem: Alcohol Use. \par  Goal (In patient's words): Manage alcohol use. \par  Status of Goal: Initial\par  Objective A: Learn and utilize effective coping skills so as to prevent increases in alcohol consumption.\par  Status of Objective: Initial \par  Individual Therapy: every 2 weeks.  [de-identified] : The writer met with the patient for individual psychotherapy via video. The writer and patient explored relationships and upcoming travel in the context of her medical health. Plans for treatment discussed. The patient did not report any SHIIPAVH. The patient was meaningfully engaged and receptive to these interventions. The patient ended the session in good behavioral and emotional control.  [FreeTextEntry1] : 1. The patient will engage in individual psychotherapy every week. \par  2. The patient will continue to be evaluated by psychiatry as appropriate.

## 2023-08-17 ENCOUNTER — APPOINTMENT (OUTPATIENT)
Dept: PSYCHIATRY | Facility: CLINIC | Age: 68
End: 2023-08-17

## 2023-08-17 NOTE — RISK ASSESSMENT
[FreeTextEntry8] : The patient did not report any suicidal ideation, plan, intent, or NSSIB.  [FreeTextEntry7] : The patient did not report any homicidal or aggressive ideation, plan, or intent.  [FreeTextEntry9] : At the present time the patient does not appear to be at imminent risk of harm to self or others.  [Low acute suicide risk] : Low acute suicide risk [No] : No [Not clinically indicated] : Safety Plan completed/updated (for individuals at risk): Not clinically indicated

## 2023-08-17 NOTE — DISCUSSION/SUMMARY
[FreeTextEntry1] : The patient was in individual therapy with the writer from November 2022 to August 2023. During that time she consistently attended and was actively engaged. The patient will continue in therapy with a new provider at [facility].

## 2023-08-17 NOTE — REASON FOR VISIT
[Telehealth (audio & video) - Individual/Group] : This visit was provided via telehealth using real-time 2-way audio visual technology. [Verbal consent obtained from patient/other participant(s)] : Verbal consent for telehealth/telephonic services obtained from patient/other participant(s) [FreeTextEntry4] : 10am [FreeTextEntry5] : [id number] [FreeTextEntry1] : Experiencing some symptoms of depressed mood and anxiety; alcohol use [Home] : at home, [unfilled] , at the time of the visit. [Other Location: e.g. Home (Enter Location, City,State)___] : at [unfilled] [Patient] : the patient [Self] : self

## 2023-08-17 NOTE — PLAN
[FreeTextEntry2] : Problem/Need I: \par  Domain for Problem/Need I: Mental Health. \par  Problem: Experiences challenging emotions in response to daily life and her medical illness and treatment. \par  Goal (In patient's words): Express challenging emotions. \par  Status of Goal: Initial \par  Objective A: Process challenging emotions in a supportive environment \par  Status of Objective: Initial \par  Individual Therapy: every 2 weeks. \par  \par  Problem/Need II: \par  Domain for Problem/Need II: Substance Abuse. \par  Problem: Alcohol Use. \par  Goal (In patient's words): Manage alcohol use. \par  Status of Goal: Initial\par  Objective A: Learn and utilize effective coping skills so as to prevent increases in alcohol consumption.\par  Status of Objective: Initial \par  Individual Therapy: every 2 weeks.  [Cognitive and/or Behavior Therapy] : Cognitive and/or Behavior Therapy  [Psychodynamic Therapy] : Psychodynamic Therapy  [de-identified] : The writer met with the patient for individual psychotherapy via video. The writer and patient processed patient reflections and goals for herself within the context of ending treatment together. Patient progress was briefly discussed. The patient did not report any SHIIPAVH. The patient was meaningfully engaged and receptive to these interventions. The patient ended the session in good behavioral and emotional control. At the present time the patient has completed therapy with the writer.  [FreeTextEntry1] : 1. The patient will engage in individual psychotherapy every week with a new provider at [facility] 2. The patient will continue to be evaluated by psychiatry as appropriate.

## 2023-08-17 NOTE — DISCUSSION/SUMMARY
[FreeTextEntry1] : The patient was in individual therapy with the writer from November 2022 to August 2023. During that time she consistently attended and was actively engaged. The patient will continue in therapy with a new provider at [facility].

## 2023-08-17 NOTE — PLAN
[FreeTextEntry2] : Problem/Need I: \par  Domain for Problem/Need I: Mental Health. \par  Problem: Experiences challenging emotions in response to daily life and her medical illness and treatment. \par  Goal (In patient's words): Express challenging emotions. \par  Status of Goal: Initial \par  Objective A: Process challenging emotions in a supportive environment \par  Status of Objective: Initial \par  Individual Therapy: every 2 weeks. \par  \par  Problem/Need II: \par  Domain for Problem/Need II: Substance Abuse. \par  Problem: Alcohol Use. \par  Goal (In patient's words): Manage alcohol use. \par  Status of Goal: Initial\par  Objective A: Learn and utilize effective coping skills so as to prevent increases in alcohol consumption.\par  Status of Objective: Initial \par  Individual Therapy: every 2 weeks.  [Cognitive and/or Behavior Therapy] : Cognitive and/or Behavior Therapy  [Psychodynamic Therapy] : Psychodynamic Therapy  [de-identified] : The writer met with the patient for individual psychotherapy via video. The writer and patient processed patient reflections and goals for herself within the context of ending treatment together. Patient progress was briefly discussed. The patient did not report any SHIIPAVH. The patient was meaningfully engaged and receptive to these interventions. The patient ended the session in good behavioral and emotional control. At the present time the patient has completed therapy with the writer.  [FreeTextEntry1] : 1. The patient will engage in individual psychotherapy every week with a new provider at [facility] 2. The patient will continue to be evaluated by psychiatry as appropriate.

## 2023-08-17 NOTE — REASON FOR VISIT
No [Telehealth (audio & video) - Individual/Group] : This visit was provided via telehealth using real-time 2-way audio visual technology. [Verbal consent obtained from patient/other participant(s)] : Verbal consent for telehealth/telephonic services obtained from patient/other participant(s) [FreeTextEntry4] : 10am [FreeTextEntry5] : [id number] [FreeTextEntry1] : Experiencing some symptoms of depressed mood and anxiety; alcohol use [Home] : at home, [unfilled] , at the time of the visit. [Other Location: e.g. Home (Enter Location, City,State)___] : at [unfilled] [Patient] : the patient [Self] : self

## 2023-09-18 ENCOUNTER — APPOINTMENT (OUTPATIENT)
Dept: PSYCHIATRY | Facility: CLINIC | Age: 68
End: 2023-09-18

## 2023-10-02 ENCOUNTER — APPOINTMENT (OUTPATIENT)
Dept: PSYCHIATRY | Facility: CLINIC | Age: 68
End: 2023-10-02

## 2023-10-05 ENCOUNTER — APPOINTMENT (OUTPATIENT)
Dept: ORTHOPEDIC SURGERY | Facility: CLINIC | Age: 68
End: 2023-10-05
Payer: MEDICARE

## 2023-10-05 DIAGNOSIS — M67.919 UNSPECIFIED DISORDER OF SYNOVIUM AND TENDON, UNSPECIFIED SHOULDER: ICD-10-CM

## 2023-10-05 DIAGNOSIS — S43.432A SUPERIOR GLENOID LABRUM LESION OF LEFT SHOULDER, INITIAL ENCOUNTER: ICD-10-CM

## 2023-10-05 PROCEDURE — 99214 OFFICE O/P EST MOD 30 MIN: CPT

## 2023-10-09 ENCOUNTER — APPOINTMENT (OUTPATIENT)
Dept: PSYCHIATRY | Facility: CLINIC | Age: 68
End: 2023-10-09

## 2023-10-16 ENCOUNTER — APPOINTMENT (OUTPATIENT)
Dept: PSYCHIATRY | Facility: CLINIC | Age: 68
End: 2023-10-16

## 2023-10-23 ENCOUNTER — APPOINTMENT (OUTPATIENT)
Dept: PSYCHIATRY | Facility: CLINIC | Age: 68
End: 2023-10-23

## 2023-10-24 ENCOUNTER — APPOINTMENT (OUTPATIENT)
Dept: PSYCHIATRY | Facility: CLINIC | Age: 68
End: 2023-10-24

## 2023-10-30 ENCOUNTER — APPOINTMENT (OUTPATIENT)
Dept: PSYCHIATRY | Facility: CLINIC | Age: 68
End: 2023-10-30

## 2023-11-06 ENCOUNTER — APPOINTMENT (OUTPATIENT)
Dept: PSYCHIATRY | Facility: CLINIC | Age: 68
End: 2023-11-06

## 2023-11-13 ENCOUNTER — APPOINTMENT (OUTPATIENT)
Dept: PSYCHIATRY | Facility: CLINIC | Age: 68
End: 2023-11-13

## 2023-11-20 ENCOUNTER — APPOINTMENT (OUTPATIENT)
Dept: PSYCHIATRY | Facility: CLINIC | Age: 68
End: 2023-11-20

## 2023-11-22 ENCOUNTER — NON-APPOINTMENT (OUTPATIENT)
Age: 68
End: 2023-11-22

## 2023-11-27 ENCOUNTER — APPOINTMENT (OUTPATIENT)
Dept: PSYCHIATRY | Facility: CLINIC | Age: 68
End: 2023-11-27

## 2023-11-27 ENCOUNTER — APPOINTMENT (OUTPATIENT)
Dept: ORTHOPEDIC SURGERY | Facility: CLINIC | Age: 68
End: 2023-11-27
Payer: MEDICARE

## 2023-11-27 DIAGNOSIS — F43.23 ADJUSTMENT DISORDER WITH MIXED ANXIETY AND DEPRESSED MOOD: ICD-10-CM

## 2023-11-27 PROCEDURE — 99213 OFFICE O/P EST LOW 20 MIN: CPT

## 2023-11-27 RX ORDER — DIAZEPAM 5 MG/1
5 TABLET ORAL
Qty: 2 | Refills: 0 | Status: COMPLETED | COMMUNITY
Start: 2022-08-04 | End: 2023-11-27

## 2023-11-28 PROBLEM — F43.23 ADJUSTMENT DISORDER WITH MIXED ANXIETY AND DEPRESSED MOOD: Status: ACTIVE | Noted: 2022-10-25

## 2023-12-04 ENCOUNTER — APPOINTMENT (OUTPATIENT)
Dept: PSYCHIATRY | Facility: CLINIC | Age: 68
End: 2023-12-04

## 2023-12-11 ENCOUNTER — APPOINTMENT (OUTPATIENT)
Dept: PSYCHIATRY | Facility: CLINIC | Age: 68
End: 2023-12-11

## 2023-12-18 ENCOUNTER — APPOINTMENT (OUTPATIENT)
Dept: PSYCHIATRY | Facility: CLINIC | Age: 68
End: 2023-12-18

## 2024-01-18 ENCOUNTER — APPOINTMENT (OUTPATIENT)
Dept: ORTHOPEDIC SURGERY | Facility: CLINIC | Age: 69
End: 2024-01-18
Payer: MEDICARE

## 2024-01-18 PROCEDURE — 99212 OFFICE O/P EST SF 10 MIN: CPT

## 2024-01-18 NOTE — PHYSICAL EXAM
[UE/LE] : Sensory: Intact in bilateral upper & lower extremities [Rad] : radial 2+ and symmetric bilaterally [Normal RUE] : Right Upper Extremity: No scars, rashes, lesions, ulcers, skin intact [Normal LUE] : Left Upper Extremity: No scars, rashes, lesions, ulcers, skin intact [Normal Touch] : sensation intact for touch [Normal] : Oriented to person, place, and time, insight and judgement were intact and the affect was normal [de-identified] : Left shoulder: Normal appearance shoulders AROM: 175 FE with discomfort on the left, IR to T9 vs T7, 150 abd. PROM: 175 FE, 50 ER at the side versus 70, 80 ER and 30 IR in the 90 degree abducted position. Motor:  5/5  supraspinatus,  5/5 ER, 5/5 IR, 5/5 biceps, 5/5 deltoid.  Normal lift off test Mild pain with Neer and Wheeler. -  X-arm.   - [place]'s, - Speeds. nontender over the biceps tendon and the anterior shoulder.   No scapular winging. Sensation is intact distally in the UE. Skin is intact in the UE.  Intact Motor distally.  Right hip Normal gait. Tender in the right sciatic notch/piriformis. Nontender greater trochanter. Negative straight leg raise. Nontender lumbar spine. Mild pain with piriformis stretch. Hip range of motion is with about 130 degrees flexion and 10 to 15 degrees internal rotation and 40 degrees of external rotation.  [de-identified] : No respiratory distress or cough [de-identified] : X-rays of left shoulder 3 views 8/10/23 showed no significant arthritis.  No definite bone lesions although there was some tiny areas of osteopenia in the humeral shaft. No calcifications.  No evidence of significant arthritis glenohumeral joint  MRI of the left shoulder at [facility] on 9/6/2023 showed supraspinatus tendinosis, degenerative change AC joint, chondral wear and subchondral cysts and bone marrow edema in the posterior glenoid and SLAP tear posterior superior labrum and evidence of adhesive capsulitis.

## 2024-01-18 NOTE — HISTORY OF PRESENT ILLNESS
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  She comes in today feeling some improvement in both motion and strength.  She can reach behind her back significantly better.  She still takes 2 Advil in the morning. She is going to physical therapy and doing her home exercises.  There is still pain with certain movements including at extremes of motion.  No night pain

## 2024-01-18 NOTE — ASSESSMENT
RT note:  Small amount of frothy white secretion was collected via 10% hypertonic neb and   Was sent to Lab. She tolerated the neb well.   [FreeTextEntry1] : 68-year-old right-hand-dominant woman with left shoulder pain and stiffness most consistent with adhesive capsulitis.  MRI also showed a SLAP tear but I think her pain and symptoms are likely all from adhesive capsulitis and hopefully will all resolve over time.  Robert VALLE.  Improvement in her motion and pain relief.  She will continue with the treatment of physical therapy and home exercises and NSAIDs as needed.  She will see me back in about 6 to 7 weeks before she goes to [place] for a trip where she was hoping to play golf. We did talk about her possibly having some ongoing pain in relation to the tendinopathy or SLAP tear in which case other treatment in the future may be necessary.

## 2024-02-28 ENCOUNTER — APPOINTMENT (OUTPATIENT)
Dept: ORTHOPEDIC SURGERY | Facility: CLINIC | Age: 69
End: 2024-02-28
Payer: MEDICARE

## 2024-02-28 DIAGNOSIS — M75.02 ADHESIVE CAPSULITIS OF LEFT SHOULDER: ICD-10-CM

## 2024-02-28 PROCEDURE — 99212 OFFICE O/P EST SF 10 MIN: CPT

## 2024-02-28 NOTE — PHYSICAL EXAM
[UE/LE] : Sensory: Intact in bilateral upper & lower extremities [Rad] : radial 2+ and symmetric bilaterally [Normal RUE] : Right Upper Extremity: No scars, rashes, lesions, ulcers, skin intact [Normal LUE] : Left Upper Extremity: No scars, rashes, lesions, ulcers, skin intact [Normal Touch] : sensation intact for touch [Normal] : Oriented to person, place, and time, insight and judgement were intact and the affect was normal [de-identified] : Left shoulder: Normal appearance shoulders AROM: 175 FE with discomfort on the left, IR to T9 vs T7, 160 abd. PROM: 175 FE, 50 ER at the side versus 70, 80 ER and 30 IR in the 90 degree abducted position. Motor:  5/5  supraspinatus,  5/5 ER, 5/5 IR, 5/5 biceps, 5/5 deltoid.  Normal lift off test Mild pain with Neer and Wheeler. -  X-arm.   - [place]'s, - Speeds. nontender over the biceps tendon and the anterior shoulder.   No scapular winging. Sensation is intact distally in the UE. Skin is intact in the UE.  Intact Motor distally.  [de-identified] : No respiratory distress or cough [de-identified] : X-rays of left shoulder 3 views 8/10/23 showed no significant arthritis.  No definite bone lesions although there was some tiny areas of osteopenia in the humeral shaft. No calcifications.  No evidence of significant arthritis glenohumeral joint  MRI of the left shoulder at [facility] [place] on 9/6/2023 showed supraspinatus tendinosis, degenerative change AC joint, chondral wear and subchondral cysts and bone marrow edema in the posterior glenoid and SLAP tear posterior superior labrum and evidence of adhesive capsulitis.

## 2024-02-28 NOTE — HISTORY OF PRESENT ILLNESS
[de-identified] : Ms. Nazario is a 66 y/o right-hand-dominant woman who comes in for follow-up for LEFT shoulder pain that started in June when she was stretching both arms overhead holding a golf club and went back too far and pulled the left shoulder and had pain right away.  She comes in today feeling progressively better.  Pain is less and motion is more and she feels like the shoulder is getting stronger.  There is still some stiffness.  She is still doing the physical therapy.  She will be in [place] for 2 months and is going to do physical therapy there and would like to try playing golf if possible.  She is not taking anything for pain and just has pain at the extremes of motion.

## 2024-02-28 NOTE — ASSESSMENT
[FreeTextEntry1] : 68-year-old right-hand-dominant woman with left shoulder pain and stiffness most consistent with adhesive capsulitis.  MRI also showed a SLAP tear. Her shoulder motion is gradually improving and pain is decreasing.  She could move her shoulder a little better today within the range of motion and there is probably incremental increase or improvement and she is getting closer to normal but still tightness/mild loss of motion in every direction. She will continue with home exercises and physical therapy.  Heat and ice and NSAIDs if needed.  Doing exercises in the water in [place] may help.  She should only do strengthening or swing the golf club within a range of motion that is comfortable to avoid aggravating the shoulder. Follow-up in a few months when she is back from [place] unless she feels like she is fully better by then.

## 2024-09-30 NOTE — END OF VISIT
[Duration of Psychotherapy Visit (minutes spent in synchronous communication): ____] : Duration of Psychotherapy Visit (minutes spent in synchronous communication): [unfilled] [Individual Psychotherapy for 38-52 minutes] : Individual Psychotherapy for 38 - 52 minutes [Teletherapy Service Provided] : The services provided in this session were delivered via tele-therapy [FreeTextEntry3] : Home [FreeTextEntry5] : MEET Yes

## 2024-10-09 ENCOUNTER — NON-APPOINTMENT (OUTPATIENT)
Age: 69
End: 2024-10-09

## 2024-10-09 ENCOUNTER — APPOINTMENT (OUTPATIENT)
Dept: OPHTHALMOLOGY | Facility: CLINIC | Age: 69
End: 2024-10-09
Payer: MEDICARE

## 2024-10-09 PROCEDURE — 92014 COMPRE OPH EXAM EST PT 1/>: CPT
